# Patient Record
Sex: MALE | Race: BLACK OR AFRICAN AMERICAN | NOT HISPANIC OR LATINO | URBAN - METROPOLITAN AREA
[De-identification: names, ages, dates, MRNs, and addresses within clinical notes are randomized per-mention and may not be internally consistent; named-entity substitution may affect disease eponyms.]

---

## 2019-03-18 ENCOUNTER — HISTORICAL (OUTPATIENT)
Dept: ADMINISTRATIVE | Facility: HOSPITAL | Age: 49
End: 2019-03-18

## 2019-05-08 ENCOUNTER — HISTORICAL (OUTPATIENT)
Dept: ADMINISTRATIVE | Facility: HOSPITAL | Age: 49
End: 2019-05-08

## 2020-04-22 ENCOUNTER — HISTORICAL (OUTPATIENT)
Dept: ADMINISTRATIVE | Facility: HOSPITAL | Age: 50
End: 2020-04-22

## 2020-04-22 LAB
ALCOHOL (ETHANOL), BLOOD: <3 MG/DL (ref 0–3)
ANION GAP SERPL CALC-SCNC: 8.5 MEQ/L (ref 10–20)
APAP SERPL-MCNC: <2 UG/ML (ref 10–30)
APPEARANCE, UA: CLEAR
BACTERIA SPEC CULT: NEGATIVE /HPF
BASOPHILS NFR BLD: 0 10 (ref 0–0.1)
BASOPHILS NFR BLD: 0.3 % (ref 0–1.5)
BILIRUB UR QL STRIP: NEGATIVE MG/DL
BUDDING YEAST: NORMAL /HPF
BUN SERPL-MCNC: 14 MG/DL (ref 7–18)
CALCIUM SERPL-MCNC: 8.8 MG/DL (ref 8.5–10.1)
CASTS, URINE MICROSCOPIC: NEGATIVE /LPF
CHLORIDE SERPL-SCNC: 108 MMOL/L (ref 98–107)
CO2 SERPL-SCNC: 28 MMOL/L (ref 22–32)
COLOR UR: YELLOW
CREAT SERPL-MCNC: 1.06 MG/DL (ref 0.7–1.3)
EGFR: 95 ML/MIN/1.73M
EOSINOPHIL NFR BLD: 0.1 10 (ref 0–0.7)
EOSINOPHIL NFR BLD: 1.4 % (ref 0–7)
EPITHELIAL, URINE MICROSCOPIC: NEGATIVE /HPF
ERYTHROCYTE [DISTWIDTH] IN BLOOD BY AUTOMATED COUNT: 12.5 % (ref 11.5–14.5)
GLUCOSE (UA): 250 MG/DL
GLUCOSE SERPL-MCNC: 110 MG/DL (ref 70–99)
GRAN #: 6.19 10 (ref 2–7.5)
GRAN%: 0.5 %
GRAN%: 70.4 % (ref 50–80)
HCT VFR BLD AUTO: 44.8 % (ref 43.5–53.7)
HGB BLD-MCNC: 14.2 G/DL (ref 14.1–18.1)
HGB UR QL STRIP: NEGATIVE ERY/UL
IMMATURE GRANULOCYTES #: 0.04 10
KETONES UR QL STRIP: NEGATIVE MG/DL
LEUKOCYTE ESTERASE UR QL STRIP: NEGATIVE LEU/UL
LYMPH #: 1.8 10 (ref 1–3.5)
LYMPH%: 20.3 % (ref 12–50)
MCH RBC QN AUTO: 28.9 PG (ref 27–31)
MCHC RBC AUTO-ENTMCNC: 31.7 G% (ref 32–35)
MCV RBC AUTO: 91.1 FL (ref 80–97)
MONO #: 0.6 10 (ref 0–0.8)
MONO%: 7.1 % (ref 0–12)
NITRITE UR QL STRIP: NEGATIVE MG/DL
OSMOC: 282 MOSM/KG (ref 275–295)
PH UR STRIP: 6.5 [PH] (ref 5–7.5)
PMV BLD AUTO: 257 10 (ref 142–424)
PMV BLD AUTO: 8.4 FL (ref 7.4–10.4)
POTASSIUM SERPL-SCNC: 3.5 MMOL/L (ref 3.5–5.1)
PROT UR QL STRIP: NEGATIVE MG/DL
RBC # BLD AUTO: 4.92 M/UL (ref 4.69–6.13)
RBC #/AREA URNS HPF: NEGATIVE /HPF
SALICYLATE LEVEL: 2.9 MG/DL (ref 2.8–20)
SARS-COV-2 RNA RESP QL NAA+PROBE: NOT DETECTED
SARSCOV2 INTERNAL CONTROL: NORMAL
SODIUM BLD-SCNC: 141 MMOL/L (ref 136–145)
SP GR UR STRIP: 1.01 (ref 1–1.03)
SPERM, URINE MICROSCOPIC: NORMAL /HPF
TYPE OF SPECIMEN  (UA): NORMAL
UNCLASSIFIED CRYSTALS, UA: NORMAL /HPF
UROBILINOGEN UR STRIP-ACNC: NORMAL EU/L
WBC # BLD AUTO: 8.8 10 (ref 4–10.2)
WBC #/AREA URNS HPF: NEGATIVE /HPF

## 2020-04-23 PROBLEM — G47.9 SLEEP DISTURBANCE: Status: ACTIVE | Noted: 2020-04-23

## 2020-04-23 PROBLEM — R45.851 DEPRESSION WITH SUICIDAL IDEATION: Status: ACTIVE | Noted: 2020-04-23

## 2020-04-23 PROBLEM — Z13.9 ENCOUNTER FOR MEDICAL SCREENING EXAMINATION: Status: ACTIVE | Noted: 2020-04-23

## 2020-04-23 PROBLEM — F32.A DEPRESSION WITH SUICIDAL IDEATION: Status: ACTIVE | Noted: 2020-04-23

## 2020-04-27 PROBLEM — F32.A DEPRESSION WITH SUICIDAL IDEATION: Status: RESOLVED | Noted: 2020-04-23 | Resolved: 2020-04-27

## 2020-04-27 PROBLEM — R45.851 DEPRESSION WITH SUICIDAL IDEATION: Status: RESOLVED | Noted: 2020-04-23 | Resolved: 2020-04-27

## 2020-07-27 PROBLEM — Z13.9 ENCOUNTER FOR MEDICAL SCREENING EXAMINATION: Status: RESOLVED | Noted: 2020-04-23 | Resolved: 2020-07-27

## 2022-04-09 ENCOUNTER — HISTORICAL (OUTPATIENT)
Dept: ADMINISTRATIVE | Facility: HOSPITAL | Age: 52
End: 2022-04-09
Payer: MEDICAID

## 2022-04-25 VITALS
HEIGHT: 66 IN | SYSTOLIC BLOOD PRESSURE: 122 MMHG | WEIGHT: 147.94 LBS | DIASTOLIC BLOOD PRESSURE: 72 MMHG | BODY MASS INDEX: 23.77 KG/M2

## 2022-04-27 ENCOUNTER — HOSPITAL ENCOUNTER (INPATIENT)
Facility: HOSPITAL | Age: 52
LOS: 8 days | Discharge: HOME | DRG: 885 | End: 2022-05-05
Attending: STUDENT IN AN ORGANIZED HEALTH CARE EDUCATION/TRAINING PROGRAM | Admitting: EMERGENCY MEDICINE
Payer: MEDICAID

## 2022-04-27 DIAGNOSIS — F32.A DEPRESSION, UNSPECIFIED DEPRESSION TYPE: ICD-10-CM

## 2022-04-27 DIAGNOSIS — R45.851 SUICIDAL IDEATION: Primary | ICD-10-CM

## 2022-04-27 LAB
ALBUMIN SERPL BCP-MCNC: 3.7 G/DL (ref 3.5–5.2)
ALP SERPL-CCNC: 88 U/L (ref 55–135)
ALT SERPL W/O P-5'-P-CCNC: 21 U/L (ref 10–44)
AMPHET+METHAMPHET UR QL: NEGATIVE
ANION GAP SERPL CALC-SCNC: 6 MMOL/L (ref 8–16)
APAP SERPL-MCNC: <2 UG/ML (ref 10–20)
AST SERPL-CCNC: 12 U/L (ref 10–40)
BARBITURATES UR QL SCN>200 NG/ML: NEGATIVE
BASOPHILS # BLD AUTO: 0.05 K/UL (ref 0–0.2)
BASOPHILS NFR BLD: 0.4 % (ref 0–1.9)
BENZODIAZ UR QL SCN>200 NG/ML: NEGATIVE
BILIRUB SERPL-MCNC: 0.3 MG/DL (ref 0.1–1)
BILIRUB UR QL STRIP: NEGATIVE
BUN SERPL-MCNC: 17 MG/DL (ref 6–20)
BZE UR QL SCN: NEGATIVE
CALCIUM SERPL-MCNC: 9.3 MG/DL (ref 8.7–10.5)
CANNABINOIDS UR QL SCN: NEGATIVE
CHLORIDE SERPL-SCNC: 108 MMOL/L (ref 95–110)
CLARITY UR: CLEAR
CO2 SERPL-SCNC: 29 MMOL/L (ref 23–29)
COLOR UR: YELLOW
CREAT SERPL-MCNC: 1.1 MG/DL (ref 0.5–1.4)
CREAT UR-MCNC: 230 MG/DL (ref 23–375)
CTP QC/QA: YES
DIFFERENTIAL METHOD: ABNORMAL
EOSINOPHIL # BLD AUTO: 0.2 K/UL (ref 0–0.5)
EOSINOPHIL NFR BLD: 1.5 % (ref 0–8)
ERYTHROCYTE [DISTWIDTH] IN BLOOD BY AUTOMATED COUNT: 12.4 % (ref 11.5–14.5)
EST. GFR  (AFRICAN AMERICAN): >60 ML/MIN/1.73 M^2
EST. GFR  (NON AFRICAN AMERICAN): >60 ML/MIN/1.73 M^2
ETHANOL SERPL-MCNC: <3 MG/DL
GLUCOSE SERPL-MCNC: 110 MG/DL (ref 70–110)
GLUCOSE UR QL STRIP: NEGATIVE
HCT VFR BLD AUTO: 42.7 % (ref 40–54)
HGB BLD-MCNC: 13.6 G/DL (ref 14–18)
HGB UR QL STRIP: NEGATIVE
IMM GRANULOCYTES # BLD AUTO: 0.05 K/UL (ref 0–0.04)
IMM GRANULOCYTES NFR BLD AUTO: 0.4 % (ref 0–0.5)
KETONES UR QL STRIP: ABNORMAL
LEUKOCYTE ESTERASE UR QL STRIP: NEGATIVE
LYMPHOCYTES # BLD AUTO: 1.6 K/UL (ref 1–4.8)
LYMPHOCYTES NFR BLD: 13.2 % (ref 18–48)
MCH RBC QN AUTO: 28.5 PG (ref 27–31)
MCHC RBC AUTO-ENTMCNC: 31.9 G/DL (ref 32–36)
MCV RBC AUTO: 90 FL (ref 82–98)
METHADONE UR QL SCN>300 NG/ML: NEGATIVE
MONOCYTES # BLD AUTO: 0.8 K/UL (ref 0.3–1)
MONOCYTES NFR BLD: 6.5 % (ref 4–15)
NEUTROPHILS # BLD AUTO: 9.6 K/UL (ref 1.8–7.7)
NEUTROPHILS NFR BLD: 78 % (ref 38–73)
NITRITE UR QL STRIP: NEGATIVE
NRBC BLD-RTO: 0 /100 WBC
OPIATES UR QL SCN: NEGATIVE
PCP UR QL SCN>25 NG/ML: NEGATIVE
PH UR STRIP: 7 [PH] (ref 5–8)
PLATELET # BLD AUTO: 260 K/UL (ref 150–450)
PMV BLD AUTO: 8.3 FL (ref 9.2–12.9)
POTASSIUM SERPL-SCNC: 4.3 MMOL/L (ref 3.5–5.1)
PROT SERPL-MCNC: 7.2 G/DL (ref 6–8.4)
PROT UR QL STRIP: NEGATIVE
RBC # BLD AUTO: 4.77 M/UL (ref 4.6–6.2)
SARS-COV-2 RDRP RESP QL NAA+PROBE: NEGATIVE
SODIUM SERPL-SCNC: 143 MMOL/L (ref 136–145)
SP GR UR STRIP: 1.02 (ref 1–1.03)
TOXICOLOGY INFORMATION: NORMAL
URN SPEC COLLECT METH UR: ABNORMAL
UROBILINOGEN UR STRIP-ACNC: 1 EU/DL
WBC # BLD AUTO: 12.32 K/UL (ref 3.9–12.7)

## 2022-04-27 PROCEDURE — 36415 COLL VENOUS BLD VENIPUNCTURE: CPT | Performed by: STUDENT IN AN ORGANIZED HEALTH CARE EDUCATION/TRAINING PROGRAM

## 2022-04-27 PROCEDURE — 82077 ASSAY SPEC XCP UR&BREATH IA: CPT | Performed by: STUDENT IN AN ORGANIZED HEALTH CARE EDUCATION/TRAINING PROGRAM

## 2022-04-27 PROCEDURE — 80143 DRUG ASSAY ACETAMINOPHEN: CPT | Performed by: STUDENT IN AN ORGANIZED HEALTH CARE EDUCATION/TRAINING PROGRAM

## 2022-04-27 PROCEDURE — 11400000 HC PSYCH PRIVATE ROOM

## 2022-04-27 PROCEDURE — 81003 URINALYSIS AUTO W/O SCOPE: CPT | Mod: 59 | Performed by: STUDENT IN AN ORGANIZED HEALTH CARE EDUCATION/TRAINING PROGRAM

## 2022-04-27 PROCEDURE — 80053 COMPREHEN METABOLIC PANEL: CPT | Performed by: STUDENT IN AN ORGANIZED HEALTH CARE EDUCATION/TRAINING PROGRAM

## 2022-04-27 PROCEDURE — 85025 COMPLETE CBC W/AUTO DIFF WBC: CPT | Performed by: STUDENT IN AN ORGANIZED HEALTH CARE EDUCATION/TRAINING PROGRAM

## 2022-04-27 PROCEDURE — 99285 EMERGENCY DEPT VISIT HI MDM: CPT | Mod: 25

## 2022-04-27 PROCEDURE — U0002 COVID-19 LAB TEST NON-CDC: HCPCS | Performed by: STUDENT IN AN ORGANIZED HEALTH CARE EDUCATION/TRAINING PROGRAM

## 2022-04-27 PROCEDURE — 25000003 PHARM REV CODE 250: Performed by: STUDENT IN AN ORGANIZED HEALTH CARE EDUCATION/TRAINING PROGRAM

## 2022-04-27 PROCEDURE — 80307 DRUG TEST PRSMV CHEM ANLYZR: CPT | Performed by: STUDENT IN AN ORGANIZED HEALTH CARE EDUCATION/TRAINING PROGRAM

## 2022-04-27 PROCEDURE — 25000003 PHARM REV CODE 250: Performed by: PSYCHIATRY & NEUROLOGY

## 2022-04-27 RX ORDER — IBUPROFEN 200 MG
1 TABLET ORAL DAILY
Status: DISCONTINUED | OUTPATIENT
Start: 2022-04-28 | End: 2022-05-05 | Stop reason: HOSPADM

## 2022-04-27 RX ORDER — OLANZAPINE 10 MG/2ML
10 INJECTION, POWDER, FOR SOLUTION INTRAMUSCULAR EVERY 8 HOURS PRN
Status: DISCONTINUED | OUTPATIENT
Start: 2022-04-27 | End: 2022-05-05 | Stop reason: HOSPADM

## 2022-04-27 RX ORDER — FOLIC ACID 1 MG/1
1 TABLET ORAL DAILY
Status: DISCONTINUED | OUTPATIENT
Start: 2022-04-28 | End: 2022-05-05 | Stop reason: HOSPADM

## 2022-04-27 RX ORDER — DOCUSATE SODIUM 100 MG/1
100 CAPSULE, LIQUID FILLED ORAL DAILY PRN
Status: DISCONTINUED | OUTPATIENT
Start: 2022-04-27 | End: 2022-05-05 | Stop reason: HOSPADM

## 2022-04-27 RX ORDER — OLANZAPINE 10 MG/1
10 TABLET ORAL DAILY
Status: DISCONTINUED | OUTPATIENT
Start: 2022-04-27 | End: 2022-04-28

## 2022-04-27 RX ORDER — MAG HYDROX/ALUMINUM HYD/SIMETH 200-200-20
30 SUSPENSION, ORAL (FINAL DOSE FORM) ORAL EVERY 6 HOURS PRN
Status: DISCONTINUED | OUTPATIENT
Start: 2022-04-27 | End: 2022-05-05 | Stop reason: HOSPADM

## 2022-04-27 RX ORDER — HYDROXYZINE PAMOATE 50 MG/1
50 CAPSULE ORAL NIGHTLY PRN
Status: DISCONTINUED | OUTPATIENT
Start: 2022-04-27 | End: 2022-05-04

## 2022-04-27 RX ORDER — LOPERAMIDE HYDROCHLORIDE 2 MG/1
2 CAPSULE ORAL
Status: DISCONTINUED | OUTPATIENT
Start: 2022-04-27 | End: 2022-05-05 | Stop reason: HOSPADM

## 2022-04-27 RX ORDER — OLANZAPINE 10 MG/1
10 TABLET ORAL EVERY 8 HOURS PRN
Status: DISCONTINUED | OUTPATIENT
Start: 2022-04-27 | End: 2022-05-05 | Stop reason: HOSPADM

## 2022-04-27 RX ORDER — ACETAMINOPHEN 325 MG/1
650 TABLET ORAL EVERY 6 HOURS PRN
Status: DISCONTINUED | OUTPATIENT
Start: 2022-04-27 | End: 2022-05-05 | Stop reason: HOSPADM

## 2022-04-27 RX ADMIN — OLANZAPINE 10 MG: 5 TABLET, FILM COATED ORAL at 12:04

## 2022-04-27 RX ADMIN — HYDROXYZINE PAMOATE 50 MG: 50 CAPSULE ORAL at 08:04
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
## 2022-04-27 NOTE — NURSING
Pt admitted from [facility] er per  with security and er tech at side. Belongings also at side and given to mht to inventory.    Pt with contraband check prior to coming on unit per security with negative findings.  Pt with hx of drug use, depression, anxiety, ptsd.  Pt released from custodial this morning.  Pt is homeless.  Pt states he was having intermittent thoughts of si this morning but denies at present. Mood depressed.  Eye contact poor. Pt tearful at times during assessment.   Pt states he is severely stressed out and decided to get help before he goes down the wrong path again.  Pt denies hi or a v hallucinations.  Gravely disabled.  Pt also c/o problems sleeping at night.  Pt oriented to unit and unit routine and plan of care.  Verbalized understanding.  Pt escorted to his room as he requests to lie down.  Voices no needs at present.  Safety checks q15 min per md orders.  Will cont to monitor pt for safety.
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
## 2022-04-28 LAB
CHOLEST SERPL-MCNC: 97 MG/DL (ref 120–199)
CHOLEST/HDLC SERPL: 1.4 {RATIO} (ref 2–5)
ESTIMATED AVG GLUCOSE: 100 MG/DL (ref 68–131)
HBA1C MFR BLD: 5.1 % (ref 4–5.6)
HDLC SERPL-MCNC: 69 MG/DL (ref 40–75)
HDLC SERPL: 71.1 % (ref 20–50)
LDLC SERPL CALC-MCNC: 17.6 MG/DL (ref 63–159)
NONHDLC SERPL-MCNC: 28 MG/DL
TRIGL SERPL-MCNC: 52 MG/DL (ref 30–150)

## 2022-04-28 PROCEDURE — 90833 PR PSYCHOTHERAPY W/PATIENT W/E&M, 30 MIN (ADD ON): ICD-10-PCS | Mod: AF,HB,, | Performed by: PSYCHIATRY & NEUROLOGY

## 2022-04-28 PROCEDURE — 99223 PR INITIAL HOSPITAL CARE,LEVL III: ICD-10-PCS | Mod: AF,HB,, | Performed by: PSYCHIATRY & NEUROLOGY

## 2022-04-28 PROCEDURE — 36415 COLL VENOUS BLD VENIPUNCTURE: CPT | Performed by: PSYCHIATRY & NEUROLOGY

## 2022-04-28 PROCEDURE — 83036 HEMOGLOBIN GLYCOSYLATED A1C: CPT | Performed by: PSYCHIATRY & NEUROLOGY

## 2022-04-28 PROCEDURE — 90833 PSYTX W PT W E/M 30 MIN: CPT | Mod: AF,HB,, | Performed by: PSYCHIATRY & NEUROLOGY

## 2022-04-28 PROCEDURE — 80061 LIPID PANEL: CPT | Performed by: PSYCHIATRY & NEUROLOGY

## 2022-04-28 PROCEDURE — 99223 1ST HOSP IP/OBS HIGH 75: CPT | Mod: AF,HB,, | Performed by: PSYCHIATRY & NEUROLOGY

## 2022-04-28 PROCEDURE — 25000003 PHARM REV CODE 250: Performed by: STUDENT IN AN ORGANIZED HEALTH CARE EDUCATION/TRAINING PROGRAM

## 2022-04-28 PROCEDURE — 11400000 HC PSYCH PRIVATE ROOM

## 2022-04-28 PROCEDURE — 25000003 PHARM REV CODE 250: Performed by: PSYCHIATRY & NEUROLOGY

## 2022-04-28 PROCEDURE — 25000003 PHARM REV CODE 250: Performed by: INTERNAL MEDICINE

## 2022-04-28 RX ORDER — SODIUM CHLORIDE 0.9 % (FLUSH) 0.9 %
10 SYRINGE (ML) INJECTION
Status: DISCONTINUED | OUTPATIENT
Start: 2022-04-28 | End: 2022-05-01

## 2022-04-28 RX ORDER — VENLAFAXINE HYDROCHLORIDE 37.5 MG/1
37.5 CAPSULE, EXTENDED RELEASE ORAL DAILY
Status: DISCONTINUED | OUTPATIENT
Start: 2022-04-28 | End: 2022-04-30

## 2022-04-28 RX ORDER — TALC
6 POWDER (GRAM) TOPICAL NIGHTLY PRN
Status: DISCONTINUED | OUTPATIENT
Start: 2022-04-28 | End: 2022-05-05 | Stop reason: HOSPADM

## 2022-04-28 RX ORDER — MIRTAZAPINE 15 MG/1
15 TABLET, FILM COATED ORAL NIGHTLY
Status: DISCONTINUED | OUTPATIENT
Start: 2022-04-28 | End: 2022-05-02

## 2022-04-28 RX ADMIN — OLANZAPINE 10 MG: 10 TABLET, FILM COATED ORAL at 11:04

## 2022-04-28 RX ADMIN — HYDROXYZINE PAMOATE 50 MG: 50 CAPSULE ORAL at 08:04

## 2022-04-28 RX ADMIN — MIRTAZAPINE 15 MG: 15 TABLET, FILM COATED ORAL at 08:04

## 2022-04-28 RX ADMIN — THERA TABS 1 TABLET: TAB at 08:04

## 2022-04-28 RX ADMIN — OLANZAPINE 10 MG: 5 TABLET, FILM COATED ORAL at 08:04

## 2022-04-28 RX ADMIN — ACETAMINOPHEN 650 MG: 325 TABLET ORAL at 08:04

## 2022-04-28 RX ADMIN — FOLIC ACID 1 MG: 1 TABLET ORAL at 08:04

## 2022-04-28 RX ADMIN — VENLAFAXINE HYDROCHLORIDE 37.5 MG: 37.5 CAPSULE, EXTENDED RELEASE ORAL at 10:04

## 2022-04-28 RX ADMIN — Medication 6 MG: at 08:04

## 2022-04-28 NOTE — PLAN OF CARE
Patient remained calm and cooperative while on shift.  Compliant with scheduled medication.  Anxiety noted.  Provided with 15 min obs to maintain safety and health.  No concerns.
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
## 2022-04-28 NOTE — SUBJECTIVE & OBJECTIVE
"Past Medical History:   Diagnosis Date    Insomnia     Insomnia        History reviewed. No pertinent surgical history.    Review of patient's allergies indicates:  No Known Allergies    No current facility-administered medications on file prior to encounter.     Current Outpatient Medications on File Prior to Encounter   Medication Sig    clonazePAM (KLONOPIN) 0.5 MG tablet Take 1 tablet (0.5 mg total) by mouth 3 (three) times daily as needed for Anxiety.    mirtazapine (REMERON) 15 MG tablet Take 1 tablet (15 mg total) by mouth every evening.    [DISCONTINUED] trazodone (DESYREL) 50 MG tablet Take 1 tablet (50 mg total) by mouth every evening.     Family History    None       Tobacco Use    Smoking status: Current Every Day Smoker     Packs/day: 1.00     Years: 25.00     Pack years: 25.00     Types: Cigarettes    Smokeless tobacco: Never Used   Substance and Sexual Activity    Alcohol use: Yes     Comment: "Crown Apple" last drink yesterday; drinks 3 days a week    Drug use: Yes     Types: Marijuana    Sexual activity: Yes     Partners: Female     Birth control/protection: None     Review of Systems   Constitutional:  Negative for fatigue and fever.   HENT:  Negative for congestion, ear pain and sore throat.    Eyes:  Negative for pain and discharge.   Respiratory:  Negative for cough, shortness of breath and wheezing.    Gastrointestinal:  Negative for abdominal pain, constipation, diarrhea, nausea and vomiting.   Endocrine: Negative for cold intolerance and heat intolerance.   Genitourinary:  Negative for difficulty urinating, dysuria and frequency.   Musculoskeletal:  Negative for arthralgias.   Allergic/Immunologic: Negative for environmental allergies.   Neurological:  Negative for dizziness, tremors and seizures.   Psychiatric/Behavioral:  Positive for behavioral problems, dysphoric mood, sleep disturbance and suicidal ideas.    All other systems reviewed and are negative.  Objective:     Vital Signs (Most " Recent):  Temp: 98.1 °F (36.7 °C) (04/27/22 1935)  Pulse: 106 (04/27/22 1935)  Resp: 16 (04/27/22 1935)  BP: 105/66 (04/27/22 1935)  SpO2: (!) 94 % (04/27/22 1935)   Vital Signs (24h Range):  Temp:  [98.1 °F (36.7 °C)-98.2 °F (36.8 °C)] 98.1 °F (36.7 °C)  Pulse:  [] 106  Resp:  [16-20] 16  SpO2:  [94 %-100 %] 94 %  BP: (105-145)/(64-93) 105/66     Weight: 68.9 kg (152 lb)  Body mass index is 23.81 kg/m².    Physical Exam  Vitals and nursing note reviewed.   Constitutional:       Appearance: Normal appearance.   HENT:      Head: Normocephalic and atraumatic.      Nose: Nose normal.      Mouth/Throat:      Mouth: Mucous membranes are moist.      Pharynx: Oropharynx is clear.   Eyes:      Extraocular Movements: Extraocular movements intact.      Conjunctiva/sclera: Conjunctivae normal.      Pupils: Pupils are equal, round, and reactive to light.   Cardiovascular:      Rate and Rhythm: Normal rate and regular rhythm.      Pulses: Normal pulses.      Heart sounds: Normal heart sounds.   Pulmonary:      Effort: Pulmonary effort is normal.      Breath sounds: Normal breath sounds.   Abdominal:      General: Abdomen is flat. Bowel sounds are normal.      Palpations: Abdomen is soft.   Musculoskeletal:         General: Normal range of motion.      Cervical back: Normal range of motion and neck supple.   Skin:     General: Skin is warm and dry.      Capillary Refill: Capillary refill takes less than 2 seconds.      Comments: No rashes on limited skin exam.   Neurological:      General: No focal deficit present.      Mental Status: He is alert and oriented to person, place, and time.      Cranial Nerves: No cranial nerve deficit.      Comments: I Olfactory:  Sense of smell intact    II Optic:  Pupils equal round react to light.  Vision intact.    III, IV, VI, Ocular motor, Trochlear, Abducens:  Extraocular movements intact    V Trigeminal:  Facial sensation intact facial sensation intact,, muscles of mastication intact  muscles of mastication intact, corneal reflex intact, corneal reflex intact    VII Facial:  Muscles of facial expression intact     VIII Vestibular cochlear: Hearing intact vestibular cochlear: Hearing intact    IX Glossopharyngeal:  Gag reflex intact.  Tasting intact.     X Vagus:  Gag reflex intact.    XI Spinal Accessory:  Shoulder shrug intact.  Head rotation intact.    XII Hypoglossal:  Tongue movements intact.     Psychiatric:      Comments: Patient appears depressed         CRANIAL NERVES     CN III, IV, VI   Pupils are equal, round, and reactive to light.     Significant Labs: All pertinent labs within the past 24 hours have been reviewed.    Significant Imaging: I have reviewed all pertinent imaging results/findings within the past 24 hours.
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
## 2022-04-28 NOTE — PSYCH
"Patient attended group psychotherapy today; when asked for what makes life worth living for him he stated, "My kids." Patient was congratulated for the statement. Patient had some difficulty following the topic yet he stayed for the full group session; patient was initially the first person who volunteered to speak and formulated the topic for the group to follow which was agreed upon by all parties.         Intervention       CBT-based group psychotherapy today focused on what does it mean to "own your own pain" and how can a person integrate their own pain into their way of being in the world in order to promote a greater sense of internal coping strategies; re-framing sources of distress and assessing progress being made in implementing a behavioral health safety plan were reviewed.          Response    Patient showed leadership skills while in the session today. Yet, his anxiety and depression is interfering in his desire to more fully explore the topic of owning his own pain and what that may mean to him to explore the full implications of this topic. Some social anxiety traits were evident as the patient spoke. Patient did contributed to the group discussion.         Plan    Patient will be encouraged to continue to participate in group psychotherapy sessions with focused attention on re-framing anxieties.   "

## 2022-04-28 NOTE — PLAN OF CARE
"Patient up early this am sitting in the dining room drinking coffee. Patient is pleasant, and cooperative. Patient endorses depression and anxiety, stated  "I slept so hard last night better than I have in 2 months with c/o sore throat". Dr. Olivo visited this am medical H&P completed. PRN Tylenol administered for sore throat and noted effective. Appetite good for meals and medication compliant without side effects noted. Denies hallucinations and ideations. Patient sat in the day room today reading personal bible and novel that he had started previously. Engaged with peers ad colton. Patient is sitting in the dining room at this time supper meal being served and telemed Social Service in progress with Taurus Tapia. Dr. Whitehead visited per telemed  this am with new orders Remeron 15 mg po HS, Effexor  37.5 mg po daily. Close observations continued and safe environment maintained.i  "

## 2022-04-29 PROCEDURE — 25000003 PHARM REV CODE 250: Performed by: PSYCHIATRY & NEUROLOGY

## 2022-04-29 PROCEDURE — 90833 PSYTX W PT W E/M 30 MIN: CPT | Mod: SA,HB,, | Performed by: PSYCHIATRY & NEUROLOGY

## 2022-04-29 PROCEDURE — 99232 SBSQ HOSP IP/OBS MODERATE 35: CPT | Mod: SA,HB,, | Performed by: PSYCHIATRY & NEUROLOGY

## 2022-04-29 PROCEDURE — 11400000 HC PSYCH PRIVATE ROOM

## 2022-04-29 PROCEDURE — 90833 PR PSYCHOTHERAPY W/PATIENT W/E&M, 30 MIN (ADD ON): ICD-10-PCS | Mod: SA,HB,, | Performed by: PSYCHIATRY & NEUROLOGY

## 2022-04-29 PROCEDURE — 99232 PR SUBSEQUENT HOSPITAL CARE,LEVL II: ICD-10-PCS | Mod: SA,HB,, | Performed by: PSYCHIATRY & NEUROLOGY

## 2022-04-29 RX ORDER — GABAPENTIN 300 MG/1
300 CAPSULE ORAL 3 TIMES DAILY
Status: DISCONTINUED | OUTPATIENT
Start: 2022-04-29 | End: 2022-05-02

## 2022-04-29 RX ADMIN — THERA TABS 1 TABLET: TAB at 08:04

## 2022-04-29 RX ADMIN — VENLAFAXINE HYDROCHLORIDE 37.5 MG: 37.5 CAPSULE, EXTENDED RELEASE ORAL at 08:04

## 2022-04-29 RX ADMIN — GABAPENTIN 300 MG: 300 CAPSULE ORAL at 11:04

## 2022-04-29 RX ADMIN — HYDROXYZINE PAMOATE 50 MG: 50 CAPSULE ORAL at 08:04

## 2022-04-29 RX ADMIN — GABAPENTIN 300 MG: 300 CAPSULE ORAL at 04:04

## 2022-04-29 RX ADMIN — GABAPENTIN 300 MG: 300 CAPSULE ORAL at 08:04

## 2022-04-29 RX ADMIN — FOLIC ACID 1 MG: 1 TABLET ORAL at 08:04

## 2022-04-29 RX ADMIN — Medication 6 MG: at 08:04

## 2022-04-29 RX ADMIN — MIRTAZAPINE 15 MG: 15 TABLET, FILM COATED ORAL at 08:04
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
## 2022-04-29 NOTE — PLAN OF CARE
"  Problem: Adult Inpatient Plan of Care  Goal: Optimal Comfort and Wellbeing  Outcome: Ongoing, Progressing   Telemental Health Protocols Employed        Group Note      Behavior    Patient attended group psychotherapy today. Patient continued to exhibit depressed mood with appropriate affect. Yet, it is to be noted that near the end of the session a verbal  assessment was completed with this patient and his depressive mood seemed to have lightened slightly after the session.         Intervention     CBT-based group psychotherapy focused this evening on how to avoid the trap of self-rejection by knowing your limitations; an exploration of the dangers of the compulsion of the "need to be needed" were discussed within the context of goal setting for time for learning how to balance time for solitude and time for others.           Response    Patient remarked that the session helped him create space in his inner most self for inner peace. Patient was asked specifically if the imagination portion of the exercise assisted in any way and he stated it helped him slow down and pay attention. Patient agreed that he was no longer feeling on pins and needles. During the course of the dialogue, patient agreed that he is better able to be hospitable to people when he is able to not have racing thoughts and sleep difficulties; agreed that the concentration he exhibited during group psychotherapy today helped him realize that he can find peace of heart and mind with the use of the imagination.           Plan      Patient will be encouraged to continue to attend group psychotherapy with continued work in the area of internal coping strategies as well as external coping strategies.        "
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
## 2022-04-29 NOTE — PLAN OF CARE
Patient remains cooperative.  Increased anxiety noted on shift and patient given Zyprexa to assist with anxiety after first med pass.  Patient slept through the night and did not wake up.  Denies ideations on night shift.  No further concerns.
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
## 2022-04-30 PROCEDURE — S4991 NICOTINE PATCH NONLEGEND: HCPCS | Performed by: PSYCHIATRY & NEUROLOGY

## 2022-04-30 PROCEDURE — 25000003 PHARM REV CODE 250: Performed by: PSYCHIATRY & NEUROLOGY

## 2022-04-30 PROCEDURE — 11400000 HC PSYCH PRIVATE ROOM

## 2022-04-30 PROCEDURE — 99232 SBSQ HOSP IP/OBS MODERATE 35: CPT | Mod: AF,HB,, | Performed by: PSYCHIATRY & NEUROLOGY

## 2022-04-30 PROCEDURE — 99232 PR SUBSEQUENT HOSPITAL CARE,LEVL II: ICD-10-PCS | Mod: AF,HB,, | Performed by: PSYCHIATRY & NEUROLOGY

## 2022-04-30 RX ORDER — VENLAFAXINE HYDROCHLORIDE 75 MG/1
75 CAPSULE, EXTENDED RELEASE ORAL DAILY
Status: DISCONTINUED | OUTPATIENT
Start: 2022-05-01 | End: 2022-05-05 | Stop reason: HOSPADM

## 2022-04-30 RX ADMIN — Medication 6 MG: at 08:04

## 2022-04-30 RX ADMIN — OLANZAPINE 10 MG: 10 TABLET, FILM COATED ORAL at 05:04

## 2022-04-30 RX ADMIN — FOLIC ACID 1 MG: 1 TABLET ORAL at 09:04

## 2022-04-30 RX ADMIN — NICOTINE 1 PATCH: 14 PATCH, EXTENDED RELEASE TRANSDERMAL at 09:04

## 2022-04-30 RX ADMIN — GABAPENTIN 300 MG: 300 CAPSULE ORAL at 03:04

## 2022-04-30 RX ADMIN — GABAPENTIN 300 MG: 300 CAPSULE ORAL at 08:04

## 2022-04-30 RX ADMIN — HYDROXYZINE PAMOATE 50 MG: 50 CAPSULE ORAL at 08:04

## 2022-04-30 RX ADMIN — VENLAFAXINE HYDROCHLORIDE 37.5 MG: 37.5 CAPSULE, EXTENDED RELEASE ORAL at 09:04

## 2022-04-30 RX ADMIN — MIRTAZAPINE 15 MG: 15 TABLET, FILM COATED ORAL at 08:04

## 2022-04-30 RX ADMIN — GABAPENTIN 300 MG: 300 CAPSULE ORAL at 09:04

## 2022-04-30 RX ADMIN — THERA TABS 1 TABLET: TAB at 09:04

## 2022-04-30 NOTE — PLAN OF CARE
POC reviewed this shift and is on going. Patient is withdrawn, depressed, anxious, irritable, and showing pressured speech. Endorses Suicidal Ideation. Denies Homicidal Ideation, Auditory Hallucinations, Visual Hallucinations, Tactile Hallucinations, Gustatory Hallucinations, and Delusions. Interacts well with peers. Easily agitated. Pt continues to be medication compliant and staff will continue to monitor pt closely while on the unit.

## 2022-04-30 NOTE — NURSING
Patient conts to become  agitated ,speaking to family and friends,making promises,attempting to please family with his promises,pt.not looking  at the big picture,was medicated with zyprexa 10mgs po for agitation,@1729,@1837,pt.quiet,acknowledges medication was effective.conts to be monitored as per protocol.

## 2022-04-30 NOTE — PROGRESS NOTES
"PSYCHIATRY DAILY INPATIENT PROGRESS NOTE  SUBSEQUENT HOSPITAL VISIT    ENCOUNTER DATE: 4/30/2022  SITE: [facility]    DATE OF ADMISSION: 4/27/2022 11:16 AM  LENGTH OF STAY: 3 days    CHIEF COMPLAINT   Leyla Rayo is a 51 y.o. male, seen during daily abreu rounds on the inpatient unit.  Leyla Rayo presented with the chief complaint of "high anxiety and taking drugs"    The patient was seen and examined. The chart was reviewed.     Reviewed notes from Rns and MD and labs from the last 24 hours.    The patient's case was discussed with the treatment team/care providers today including Rns and MD    Staff reports no behavioral or management issues.     The patient has been compliant with treatment.    Subjective 04/30/2022    Patient presents today with blunted affect, reports ongoing depression and anxiety, racing thoughts. Patient cites numerous stressors, including conflict with family members.    Pt reports doing alright today. States that he is tolerating medication without side effects. Continues to report depressed mood, but this is somewhat improving. Has noticed improved concentration.     Amenable to titration of Effexor    The patient reports side effects of  "feeling jumpy" after receiving PRN zyprexa last night. Symptoms resolved upon waking this morning.  .    Psychiatric ROS (observed, reported, or endorsed/denied):  Depressed mood - Continuing  Interest/pleasure/anhedonia: Continuing  Guilt/hopelessness/worthlessness - Continuing  Changes in Sleep - Continuing  Changes in Appetite - denies  Changes in Concentration - Continuing  Changes in Energy - Continuing  PMA/R- None  Suicidal- active/passive ideations - variable  Homicidal ideations: active/passive ideations - None    Hallucinations - denies  Delusions - denies  Disorganized behavior - denies  Disorganized speech - denies  Negative symptoms - denies    Elevated mood - denies  Decreased need for sleep - denies  Grandiosity - denies  Racing " thoughts - denies  Impulsivity - denies  Irritability- denies  Increased energy - denies  Distractibility - denies  Increase in goal-directed activity or PMA- denies    Symptoms of FREEMAN - Yes  Symptoms of Panic Disorder- None  Symptoms of PTSD - Yes    Overall progress: Patient is showing no improvement on the Unit to date    Psychotherapy:  · Target symptoms: depression, anxiety   · Why chosen therapy is appropriate versus another modality: relevant to diagnosis, evidence based practice  · Outcome monitoring methods: self-report, observation  · Therapeutic intervention type: insight oriented psychotherapy, supportive psychotherapy  · Topics discussed/themes: relationships difficulties, building skills sets for symptom management, symptom recognition  · The patient's response to the intervention is accepting. The patient's progress toward treatment goals is fair.   · Duration of intervention: 16 minutes.    Medical ROS  Review of Systems   Constitutional: Negative.    HENT: Negative.    Eyes: Negative.    Respiratory: Negative.    Cardiovascular: Negative.    Gastrointestinal: Negative.    Genitourinary: Negative.    Musculoskeletal: Negative.    Skin: Negative.    Neurological:        Reports neuropathic pain to bilateral hands   Endo/Heme/Allergies: Negative.    Psychiatric/Behavioral:        As noted       PAST MEDICAL HISTORY   Past Medical History:   Diagnosis Date    History of psychiatric hospitalization     Insomnia     Insomnia     Psychiatric problem        PSYCHOTROPIC MEDICATIONS   Scheduled Meds:   folic acid  1 mg Oral Daily    gabapentin  300 mg Oral TID    mirtazapine  15 mg Oral QHS    multivitamin  1 tablet Oral Daily    nicotine  1 patch Transdermal Daily    venlafaxine  37.5 mg Oral Daily     Continuous Infusions:  PRN Meds:.acetaminophen, aluminum-magnesium hydroxide-simethicone, docusate sodium, hydrOXYzine pamoate, loperamide, melatonin, OLANZapine **AND** OLANZapine, sodium chloride  "0.9%    EXAMINATION    VITALS   Vitals:    04/28/22 1939 04/29/22 0827 04/29/22 1908 04/30/22 0741   BP: 116/60 119/60 (!) 114/58 135/86   BP Location: Left arm Left arm Left arm Left arm   Patient Position: Sitting Sitting Sitting Sitting   Pulse: 92 87 96 95   Resp: 20 20 20 20   Temp: 98.3 °F (36.8 °C) 97.8 °F (36.6 °C) 98.9 °F (37.2 °C) 98 °F (36.7 °C)   TempSrc: Oral Oral Oral Oral   SpO2: 97% 97% 96% 99%   Weight:       Height:           Body mass index is 23.81 kg/m².    CONSTITUTIONAL  General Appearance: unremarkable, age appropriate, normal weight    MUSCULOSKELETAL  Muscle Strength and Tone:no tremor, no tic  Abnormal Involuntary Movements: No  Gait and Station: non-ataxic    PSYCHIATRIC   Level of Consciousness: awake, alert  and oriented  Orientation: person, place, time, situation, day of week, month of year and year  Grooming: Casually dressed and Well groomed  Psychomotor Behavior: normal, cooperative, friendly and cooperative  Speech: monotone  Language: grossly intact  Mood: "depressed, improving."  Affect: Blunted  Thought Process: linear, logical  Associations: intact   Thought Content: DENIES homicidal ideation, recent suicidal ideation as noted above and no delusions  Perceptions: denies hallucinations  Memory: Able to recall past events, Remote intact and Recent intact  Attention:Attends to interview without distraction  Fund of Knowledge: Aware of current events and Vocabulary appropriate   Estimate if Intelligence:  Average based on work/education history, vocabulary and mental status exam  Insight: has awareness of illness  Judgment: behavior is adequate to circumstances    DIAGNOSTIC TESTING   Laboratory Results  No results found for this or any previous visit (from the past 24 hour(s)).    MEDICAL DECISION MAKING       ASSESSMENT         MDD, recurrent, severe without psychotic features  Unspecified Anxiety Disorder  PTSD     Nicotine Dependence  Polysubstance dependence in early full " remission     Psychosocial stressors     Chronic pain        PROBLEM LIST AND MANAGEMENT PLANS     Depression: pt counseled  -start trial of Effexor XR at 37.5 mg po q day --> Titrate to 75mg PO daily  -start re-trial of Remeron at 15 mg po q HS     Anxiety: pt counseled  -meds as above  -vistaril prn  -Gabapentin off label as below     PTSD: pt counseled  -meds as above  -consider trial of prazosin     Nicotine Dependence: pt counseled  -start nicotine 14 mg patch dermal q day     Polysubstance dependence in early full remission: pt counseled  -encouraged abstinence      Psychosocial stressors: pt counseled  -SW consulted to assist with resources      Chronic pain: pt counseled  - Gabapentin 300 mg TID, titrate as indicated    PRESCRIPTION DRUG MANAGEMENT  Compliance: yes  Side Effects: no  Regimen Adjustments: see above     Discussed diagnosis, risks and benefits of proposed treatment vs alternative treatments vs no treatment, potential side effects of these treatments and the inherent unpredictability of treatment. The patient expresses understanding of the above and displays the capacity to agree with this treatment given said understanding. Patient also agrees that, currently, the benefits outweigh the risks and would like to pursue/continue treatment at this time.        DIAGNOSTIC TESTING  Labs reviewed with patient; follow up pending labs     Disposition:  -Will attempt to obtain outside psychiatric records if available  -SW to assist with aftercare planning and collateral  -Once stable discharge home with outpatient follow up care and/or rehab  -Continue inpatient treatment under a PEC and/or CEC for danger to self/ danger to others/grave disability as evident by danger to self and gravely disabled    STAFF:   Rick Erickson MD  Psychiatry

## 2022-05-01 PROCEDURE — 25000003 PHARM REV CODE 250: Performed by: PSYCHIATRY & NEUROLOGY

## 2022-05-01 PROCEDURE — 99232 PR SUBSEQUENT HOSPITAL CARE,LEVL II: ICD-10-PCS | Mod: AF,HB,, | Performed by: PSYCHIATRY & NEUROLOGY

## 2022-05-01 PROCEDURE — 11400000 HC PSYCH PRIVATE ROOM

## 2022-05-01 PROCEDURE — 99232 SBSQ HOSP IP/OBS MODERATE 35: CPT | Mod: AF,HB,, | Performed by: PSYCHIATRY & NEUROLOGY

## 2022-05-01 PROCEDURE — S4991 NICOTINE PATCH NONLEGEND: HCPCS | Performed by: PSYCHIATRY & NEUROLOGY

## 2022-05-01 RX ADMIN — MIRTAZAPINE 15 MG: 15 TABLET, FILM COATED ORAL at 08:05

## 2022-05-01 RX ADMIN — VENLAFAXINE HYDROCHLORIDE 75 MG: 75 CAPSULE, EXTENDED RELEASE ORAL at 08:05

## 2022-05-01 RX ADMIN — GABAPENTIN 300 MG: 300 CAPSULE ORAL at 08:05

## 2022-05-01 RX ADMIN — THERA TABS 1 TABLET: TAB at 08:05

## 2022-05-01 RX ADMIN — NICOTINE 1 PATCH: 14 PATCH, EXTENDED RELEASE TRANSDERMAL at 09:05

## 2022-05-01 RX ADMIN — Medication 6 MG: at 08:05

## 2022-05-01 RX ADMIN — HYDROXYZINE PAMOATE 50 MG: 50 CAPSULE ORAL at 08:05

## 2022-05-01 RX ADMIN — FOLIC ACID 1 MG: 1 TABLET ORAL at 08:05

## 2022-05-01 RX ADMIN — GABAPENTIN 300 MG: 300 CAPSULE ORAL at 02:05

## 2022-05-01 NOTE — PLAN OF CARE
POC reviewed this shift and is on going. Patient is alert,but preoccupied,hyperverbal,thoughts disorganized,mood labile, Endorses the need to have closure with his exwife, Denies h/s ideations, Pt continues to be medication compliant and staff will continue to monitor pt closely while on the unit. ,med education with gabapentin,pt.states he have a decrease of racing thoughts,care as per treatment plan.

## 2022-05-01 NOTE — PROGRESS NOTES
"PSYCHIATRY DAILY INPATIENT PROGRESS NOTE  SUBSEQUENT HOSPITAL VISIT    ENCOUNTER DATE: 5/1/2022  SITE: [facility]    DATE OF ADMISSION: 4/27/2022 11:16 AM  LENGTH OF STAY: 4 days    CHIEF COMPLAINT   Leyla Rayo is a 51 y.o. male, seen during daily abreu rounds on the inpatient unit.  Leyla Rayo presented with the chief complaint of "high anxiety and taking drugs"    The patient was seen and examined. The chart was reviewed.     Reviewed notes from Rns and MD and labs from the last 24 hours.    The patient's case was discussed with the treatment team/care providers today including Rns and MD    Staff reports no behavioral or management issues.     The patient has been compliant with treatment.    Subjective 05/01/2022    Patient presents today with blunted affect, reports ongoing depression and anxiety, racing thoughts. Patient cites numerous stressors, including conflict with family members.    Pt reports doing alright today. States he tolerated titration of effexor without any issues.States his mood today is "pretty good." States that his depression continues to improve. Denies anxiety today. States that being on a schedule has been helpful for him. States that he left his nicotine patch on last night and he thinks this made him feel jittery. States he had some trouble falling asleep. Would like to try taking the patch off prior to bedtime tonight rather than adjusting meds.     Pt got upset with his sister yesterday due to a disagreement, but feels he was better able to control his response to this, which he feels good about.     Denies DES and AVH.     The patient denies any side effects to medications.    Psychiatric ROS (observed, reported, or endorsed/denied):  Depressed mood - Continuing  Interest/pleasure/anhedonia: Continuing  Guilt/hopelessness/worthlessness - Continuing  Changes in Sleep - Continuing  Changes in Appetite - denies  Changes in Concentration - Continuing  Changes in Energy - " Continuing  PMA/R- None  Suicidal- active/passive ideations - variable  Homicidal ideations: active/passive ideations - None    Hallucinations - denies  Delusions - denies  Disorganized behavior - denies  Disorganized speech - denies  Negative symptoms - denies    Elevated mood - denies  Decreased need for sleep - denies  Grandiosity - denies  Racing thoughts - denies  Impulsivity - denies  Irritability- denies  Increased energy - denies  Distractibility - denies  Increase in goal-directed activity or PMA- denies    Symptoms of FREEMAN - Yes  Symptoms of Panic Disorder- None  Symptoms of PTSD - Yes    Overall progress: Patient is showing no improvement on the Unit to date    Psychotherapy:  · Target symptoms: depression, anxiety   · Why chosen therapy is appropriate versus another modality: relevant to diagnosis, evidence based practice  · Outcome monitoring methods: self-report, observation  · Therapeutic intervention type: insight oriented psychotherapy, supportive psychotherapy  · Topics discussed/themes: relationships difficulties, building skills sets for symptom management, symptom recognition  · The patient's response to the intervention is accepting. The patient's progress toward treatment goals is fair.   · Duration of intervention: 16 minutes.    Medical ROS  Review of Systems   Constitutional: Negative.    HENT: Negative.    Eyes: Negative.    Respiratory: Negative.    Cardiovascular: Negative.    Gastrointestinal: Negative.    Genitourinary: Negative.    Musculoskeletal: Negative.    Skin: Negative.    Neurological:        Reports neuropathic pain to bilateral hands   Endo/Heme/Allergies: Negative.    Psychiatric/Behavioral:        As noted       PAST MEDICAL HISTORY   Past Medical History:   Diagnosis Date    History of psychiatric hospitalization     Insomnia     Insomnia     Psychiatric problem        PSYCHOTROPIC MEDICATIONS   Scheduled Meds:   folic acid  1 mg Oral Daily    gabapentin  300 mg Oral  "TID    mirtazapine  15 mg Oral QHS    multivitamin  1 tablet Oral Daily    nicotine  1 patch Transdermal Daily    venlafaxine  75 mg Oral Daily     Continuous Infusions:  PRN Meds:.acetaminophen, aluminum-magnesium hydroxide-simethicone, docusate sodium, hydrOXYzine pamoate, loperamide, melatonin, OLANZapine **AND** OLANZapine, sodium chloride 0.9%    EXAMINATION    VITALS   Vitals:    04/29/22 1908 04/30/22 0741 04/30/22 1913 05/01/22 0736   BP: (!) 114/58 135/86 125/80 127/77   BP Location: Left arm Left arm Left arm Right arm   Patient Position: Sitting Sitting Sitting    Pulse: 96 95 (!) 119 (!) 112   Resp: 20 20 20 20   Temp: 98.9 °F (37.2 °C) 98 °F (36.7 °C) 99.5 °F (37.5 °C) 99 °F (37.2 °C)   TempSrc: Oral Oral Oral Oral   SpO2: 96% 99% (!) 94% 98%   Weight:       Height:           Body mass index is 23.81 kg/m².    CONSTITUTIONAL  General Appearance: unremarkable, age appropriate, normal weight    MUSCULOSKELETAL  Muscle Strength and Tone:no tremor, no tic  Abnormal Involuntary Movements: No  Gait and Station: non-ataxic    PSYCHIATRIC   Level of Consciousness: awake, alert  and oriented  Orientation: person, place, time, situation, day of week, month of year and year  Grooming: Casually dressed and Well groomed  Psychomotor Behavior: normal, cooperative, friendly and cooperative  Speech: monotone  Language: grossly intact  Mood: "depressed, improving."  Affect: Blunted  Thought Process: linear, logical  Associations: intact   Thought Content: DENIES homicidal ideation, recent suicidal ideation as noted above and no delusions  Perceptions: denies hallucinations  Memory: Able to recall past events, Remote intact and Recent intact  Attention:Attends to interview without distraction  Fund of Knowledge: Aware of current events and Vocabulary appropriate   Estimate if Intelligence:  Average based on work/education history, vocabulary and mental status exam  Insight: has awareness of illness  Judgment: " behavior is adequate to circumstances    DIAGNOSTIC TESTING   Laboratory Results  No results found for this or any previous visit (from the past 24 hour(s)).    MEDICAL DECISION MAKING       ASSESSMENT         MDD, recurrent, severe without psychotic features  Unspecified Anxiety Disorder  PTSD     Nicotine Dependence  Polysubstance dependence in early full remission     Psychosocial stressors     Chronic pain        PROBLEM LIST AND MANAGEMENT PLANS     Depression: pt counseled  -start trial of Effexor XR at 37.5 mg po q day --> Titrate to 75mg PO daily on 4/30  -start re-trial of Remeron at 15 mg po q HS     Anxiety: pt counseled  -meds as above  -vistaril prn  -Gabapentin off label as below     PTSD: pt counseled  -meds as above  -consider trial of prazosin     Nicotine Dependence: pt counseled  -start nicotine 14 mg patch dermal q day     Polysubstance dependence in early full remission: pt counseled  -encouraged abstinence      Psychosocial stressors: pt counseled  -SW consulted to assist with resources      Chronic pain: pt counseled  - Gabapentin 300 mg TID, titrate as indicated    PRESCRIPTION DRUG MANAGEMENT  Compliance: yes  Side Effects: no  Regimen Adjustments: see above     Discussed diagnosis, risks and benefits of proposed treatment vs alternative treatments vs no treatment, potential side effects of these treatments and the inherent unpredictability of treatment. The patient expresses understanding of the above and displays the capacity to agree with this treatment given said understanding. Patient also agrees that, currently, the benefits outweigh the risks and would like to pursue/continue treatment at this time.        DIAGNOSTIC TESTING  Labs reviewed with patient; follow up pending labs     Disposition:  -Will attempt to obtain outside psychiatric records if available  -SW to assist with aftercare planning and collateral  -Once stable discharge home with outpatient follow up care and/or  rehab  -Continue inpatient treatment under a PEC and/or CEC for danger to self/ danger to others/grave disability as evident by danger to self and gravely disabled    STAFF:   Rick Erickson MD  Psychiatry

## 2022-05-01 NOTE — PLAN OF CARE
POC reviewed and ongoing. Pt was in better spirits because he was able to speak to one of his daughters he hasn't been able to reach. He says he still has some depression and a lot of anxiety. Pt discussed with writer, that he does realize the mistakes he has made and wants to get better. He is sleeping better and that seems to be helping him. Denied SI/HI/AVH. Will continue to monitor for safety and any changes.

## 2022-05-01 NOTE — PSYCH
Community Group  Time:[id number] am  Short Term Goal: Group  Patient Response: Patient did not attend group

## 2022-05-02 PROCEDURE — 11400000 HC PSYCH PRIVATE ROOM

## 2022-05-02 PROCEDURE — 25000003 PHARM REV CODE 250: Performed by: PSYCHIATRY & NEUROLOGY

## 2022-05-02 PROCEDURE — 90833 PSYTX W PT W E/M 30 MIN: CPT | Mod: SA,HB,, | Performed by: PSYCHIATRY & NEUROLOGY

## 2022-05-02 PROCEDURE — 99232 PR SUBSEQUENT HOSPITAL CARE,LEVL II: ICD-10-PCS | Mod: SA,HB,, | Performed by: PSYCHIATRY & NEUROLOGY

## 2022-05-02 PROCEDURE — S4991 NICOTINE PATCH NONLEGEND: HCPCS | Performed by: PSYCHIATRY & NEUROLOGY

## 2022-05-02 PROCEDURE — 90833 PR PSYCHOTHERAPY W/PATIENT W/E&M, 30 MIN (ADD ON): ICD-10-PCS | Mod: SA,HB,, | Performed by: PSYCHIATRY & NEUROLOGY

## 2022-05-02 PROCEDURE — 99232 SBSQ HOSP IP/OBS MODERATE 35: CPT | Mod: SA,HB,, | Performed by: PSYCHIATRY & NEUROLOGY

## 2022-05-02 RX ORDER — MIRTAZAPINE 15 MG/1
30 TABLET, FILM COATED ORAL NIGHTLY
Status: DISCONTINUED | OUTPATIENT
Start: 2022-05-02 | End: 2022-05-05 | Stop reason: HOSPADM

## 2022-05-02 RX ORDER — GABAPENTIN 400 MG/1
400 CAPSULE ORAL 3 TIMES DAILY
Status: DISCONTINUED | OUTPATIENT
Start: 2022-05-02 | End: 2022-05-04

## 2022-05-02 RX ADMIN — GABAPENTIN 400 MG: 400 CAPSULE ORAL at 08:05

## 2022-05-02 RX ADMIN — GABAPENTIN 300 MG: 300 CAPSULE ORAL at 08:05

## 2022-05-02 RX ADMIN — HYDROXYZINE PAMOATE 50 MG: 50 CAPSULE ORAL at 08:05

## 2022-05-02 RX ADMIN — FOLIC ACID 1 MG: 1 TABLET ORAL at 08:05

## 2022-05-02 RX ADMIN — THERA TABS 1 TABLET: TAB at 08:05

## 2022-05-02 RX ADMIN — MIRTAZAPINE 30 MG: 15 TABLET, FILM COATED ORAL at 08:05

## 2022-05-02 RX ADMIN — GABAPENTIN 400 MG: 400 CAPSULE ORAL at 02:05

## 2022-05-02 RX ADMIN — NICOTINE 1 PATCH: 14 PATCH, EXTENDED RELEASE TRANSDERMAL at 08:05

## 2022-05-02 RX ADMIN — Medication 6 MG: at 08:05

## 2022-05-02 RX ADMIN — VENLAFAXINE HYDROCHLORIDE 75 MG: 75 CAPSULE, EXTENDED RELEASE ORAL at 08:05

## 2022-05-02 NOTE — PLAN OF CARE
Patient is alert and oriented, pleasant, and cooperative. Appetite is good and medication compliant without side effects noted. Patient spends most of the day out of room, ambulates on unit, and engages with peers, and playing board games. Patient endorses depression and anxiety is improving, c/o sleep at . Patient has been on the phone on and off today calling people for a place to stay upon discharge which may be contributing to anxiety. Patient did attend all groups offered today. Patient enjoying music therapy at this time. Close observations continued and safe environment maintained.

## 2022-05-02 NOTE — HISTORICAL [facility]
This is a historical note converted from [facility]. Formatting and pictures may have been removed.  Please reference Candice for original formatting and attached multimedia. Chief Complaint  5 week s/p Lt Humerus Sx-2/25/19--Gl--5/6/19. Complains of pain radiating down his left arm, pain with movement, states he takes otc medication.  History of Present Illness  2/5/2019: ORIF left humeral shaft  ?   He returns today. He has had some pain that is noticed over his?clavicle especially with forward flexion and abduction.  Review of Systems  Comprehensive review of system?was performed with no exceptions other than noted in the history of present illness  Physical Exam  Vitals & Measurements  HR:?79(Peripheral)? BP:?122/72?  HT:?167.6?cm? WT:?67.1?kg? BMI:?23.89?  Gen: WN, WD, NAD  Card/Res: NL breathing, +distal pulses  Abdomen: ND  Muscle skeletal: He has some tenderness palpation over his distal clavicle?with a palpable callus. ?He is got forward flexion to 120 degrees,?abduction?90 degrees, external rotation?60 degrees  Assessment/Plan  1.?Clavicle fracture?S42.009A  ?Interval healing noted on radiograph.? Continue activity modification.  ?  Humerus fracture?S42.309A  Improving status post above.? We will keep him off work over the next 6 weeks. ?I will see him back in?6 weeks with radiographs of his left humerus and left clavicle.? Plan to return to work at that point  Ordered:  Clinic Follow up, *Est. 04/29/19 3:00:00 CDT, Order for future visit, Humerus fracture, [facility]  Post-Op follow-up visit [id number] PC, Humerus fracture, [facility], 03/18/19 11:08:00 CDT  Treat clavicle fracture [id number] PC, 03/18/19 11:15:00 CDT, [facility], Routine, 03/18/19 11:15:00 CDT  ?   Problem List/Past Medical History  Ongoing  No qualifying data  Historical  No qualifying data  Procedure/Surgical History  ORIF Humerus (Left) (02/05/2019)   Medications  Percocet 5/325, Oral, q6hr,? ?Not taking  Allergies  No  Known Allergies  No Known Medication Allergies  Social History  Alcohol  Current, 1-2 times per month, 02/04/2019  Substance Abuse  Current, Cocaine, 02/04/2019  Tobacco  Smoker, current status unknown, No, 03/18/2019  Smoker, current status unknown, No, 02/18/2019  Smoker, current status unknown, Cigarettes, No, 02/04/2019  Family History  Family history is negative  Immunizations  Vaccine Date Status   tetanus/diphtheria/pertussis, acel(Tdap) 02/04/2019 Given   Health Maintenance  Health Maintenance  ???Pending?(in the next year)  ??? ??OverDue  ??? ? ? ?Diabetes Screening due??and every?  ??? ??Due?  ??? ? ? ?ADL Screening due??03/18/19??and every 1??year(s)  ??? ? ? ?Alcohol Misuse Screening due??03/18/19??and every 1??year(s)  ??? ? ? ?Lipid Screening due??03/18/19??and every?  ??? ? ? ?Smoking Cessation due??03/18/19??Variable frequency  ??? ??Due In Future?  ??? ? ? ?Aspirin Therapy for CVD Prevention not due until??02/06/20??and every 1??year(s)  ??? ? ? ?Blood Pressure Screening not due until??02/18/20??and every 1??year(s)  ??? ? ? ?Body Mass Index Check not due until??02/18/20??and every 1??year(s)  ??? ? ? ?Depression Screening not due until??02/18/20??and every 1??year(s)  ???Satisfied?(in the past 1 year)  ??? ??Satisfied?  ??? ? ? ?Aspirin Therapy for CVD Prevention on??02/06/19.??Satisfied by Blanca Clements RN  ??? ? ? ?Blood Pressure Screening on??03/18/19.??Satisfied by Le Watson  ??? ? ? ?Body Mass Index Check on??03/18/19.??Satisfied by Le Watson  ??? ? ? ?Depression Screening on??02/18/19.??Satisfied by Le Watson  ??? ? ? ?Diabetes Screening on??02/04/19.??Satisfied by Nelda Delarosa  ??? ? ? ?Influenza Vaccine on??02/18/19.??Satisfied by Le Watson  ??? ? ? ?Obesity Screening on??03/18/19.??Satisfied by Le Watson  ??? ? ? ?Tetanus Vaccine on??02/04/19.??Satisfied by Dana DIETRICH, Cyndi  ?  ?  Diagnostic  Results  Clavicle radiographs?and humerus radiographs 3/18/2019:?Multiple views of both show a?interval healing.     [1]?Office Visit Note; Sarika SCHMID MD, Nic OBREGON 02/18/2019 12:32 CST   Clavicle radiographs showed a distal third?clavicle shaft fracture. ?This is an oblique fracture. ?This occurred at his initial?traumatic episode.? The fracture itself is well aligned and I see some early callus formation superiorly.? We will keep him minimal weightbearing and range of motion restrictions while this finishes healing.? I will see him back in 6 weeks with repeat radiographs

## 2022-05-02 NOTE — HISTORICAL [facility]ER
This is a historical note converted from Candice. Formatting and pictures may have been removed.  Please reference Candice for original formatting and attached multimedia. Chief Complaint  PT. REPORTS THAT HE HAS BEEN HAVING SHOULDER PAIN FOR 1-2 WEEKS. ?XRAY DONE TODAY. SX: 2/5/19. ?PT. IS NOT TAKING ANYTHING FOR THE PAIN....SB  History of Present Illness  2/5/2019: ORIF left humeral shaft  ?   He returns today. He is doing some light work with his father. ?He is noticed some pain particular towards the end of the day. ?He also notices some associated weakness.  Review of Systems  Comprehensive review of system?was performed with no exceptions other than noted in the history of present illness  Physical Exam  Vitals & Measurements  HT:?167.6?cm? WT:?67.1?kg? BMI:?23.89?  Gen: WN, WD, NAD  Card/Res: NL breathing, +distal pulses  Abdomen: ND  Shoulder Exam:??????????Right??????????Left  Skin:??????????????????????????????Normal???????Normal  AC joint tenderness:??????????None??????????None  Forward Flexion:?????????????180 ??????????180  Abduction:?????????????????????180??????????? 180  External Rotation: ????????????? 80??????????????80  Internal Rotation?????????????? 80 ???????????? 80  Supraspinatus stress test?????? Neg??????????+  Roche Impingement:?? ?????Neg ?????????? ?Neg  Neer Impingement:?????????????Neg??????????Neg  Apprehension:???????????????????? Neg??????????Neg  OBriens:????????????????????????????Neg????????? Neg  Speeds test:??????????????????????? Neg??????????Neg  Strength:  External Rotation:???????????????5/5???????????????4/5  Lift Off/belly press:????????????5/5???????????????5/5  ?   N-V status:?????????????????????????Intact??????????Intact  ?   C-spine: Normal ROM, NT  ?  ?  Assessment/Plan  Closed fracture of clavicle with routine healing?S42.009D  Ordered:  Post-Op follow-up visit [id number] PC, Closed fracture of clavicle with routine healing  Closed fracture of humerus with routine  healing, [facility], 05/08/19 14:50:00 CDT  ?  Closed fracture of humerus with routine healing?S42.309D  ? Interval healing of both the humerus and the clavicle. ?I like for him to work on strengthening and endurance over the next few months. ?He can return to work at light duty?currently. ?I think he be ready to go for full duty in July. ?Provided with some notes. ?I will see him back if he has any issues  Ordered:  Post-Op follow-up visit [id number] PC, Closed fracture of clavicle with routine healing  Closed fracture of humerus with routine healing, [facility], 05/08/19 14:50:00 CDT  ?  Orders:  Clinic Follow-up PRN, 05/08/19 14:50:00 CDT, Future Order, [facility]  Referrals  Clinic Follow-up PRN, 05/08/19 14:50:00 CDT, [facility]   Problem List/Past Medical History  Ongoing  No qualifying data  Historical  No qualifying data  Procedure/Surgical History  ORIF Humerus (Left) (02/05/2019)  Reposition Left Humeral Shaft with Internal Fixation Device, Open Approach (02/05/2019)  Reposition Left Humeral Shaft, External Approach (02/04/2019)   Medications  Percocet 5/325, Oral, q6hr,? ?Not taking  Allergies  No Known Medication Allergies  Social History  Alcohol  Current, 1-2 times per month, 02/04/2019  Substance Abuse  Current, Cocaine, 02/04/2019  Tobacco  4 or less cigarettes(less than 1/4 pack)/day in last 30 days, No, 05/08/2019  Family History  Family history is negative  Immunizations  Vaccine Date Status   tetanus/diphtheria/pertussis, acel(Tdap) 02/04/2019 Given   Health Maintenance  Health Maintenance  ???Pending?(in the next year)  ??? ??OverDue  ??? ? ? ?Diabetes Screening due??and every?  ??? ? ? ?Alcohol Misuse Screening due??01/01/19??and every 1??year(s)  ??? ? ? ?Smoking Cessation due??01/01/19??Variable frequency  ??? ??Due?  ??? ? ? ?ADL Screening due??05/08/19??and every 1??year(s)  ??? ? ? ?Lipid Screening due??05/08/19??and every?  ??? ??Due In Future?  ???  ? ? ?Obesity Screening not due until??01/01/20??and every 1??year(s)  ??? ? ? ?Aspirin Therapy for CVD Prevention not due until??02/06/20??and every 1??year(s)  ??? ? ? ?Depression Screening not due until??02/18/20??and every 1??year(s)  ??? ? ? ?Blood Pressure Screening not due until??03/17/20??and every 1??year(s)  ??? ? ? ?Body Mass Index Check not due until??03/17/20??and every 1??year(s)  ???Satisfied?(in the past 1 year)  ??? ??Satisfied?  ??? ? ? ?Aspirin Therapy for CVD Prevention on??02/06/19.??Satisfied by Blanca Clements RN  ??? ? ? ?Blood Pressure Screening on??03/18/19.??Satisfied by Le Watson  ??? ? ? ?Body Mass Index Check on??05/08/19.??Satisfied by Yue Sifuentes  ??? ? ? ?Depression Screening on??02/18/19.??Satisfied by Le Watson  ??? ? ? ?Diabetes Screening on??02/04/19.??Satisfied by Nelda Delarosa  ??? ? ? ?Influenza Vaccine on??02/18/19.??Satisfied by Le Watson  ??? ? ? ?Obesity Screening on??05/08/19.??Satisfied by Yue Sifuentes  ??? ? ? ?Tetanus Vaccine on??02/04/19.??Satisfied by Dana DIETRICH, Cyndi  ?  ?  Diagnostic Results  Humerus and shoulder radiographs show?healed fractures.     [1]?Office Visit Note; Sarika SCHMID MD, Nic OBREGON 03/18/2019 11:08 CDT

## 2022-05-02 NOTE — PROGRESS NOTES
"PSYCHIATRY DAILY INPATIENT PROGRESS NOTE  SUBSEQUENT HOSPITAL VISIT    ENCOUNTER DATE: 5/2/2022  SITE: [facility]    DATE OF ADMISSION: 4/27/2022 11:16 AM  LENGTH OF STAY: 5 days    CHIEF COMPLAINT   Leyla Rayo is a 51 y.o. male, seen during daily abreu rounds on the inpatient unit.  Leyla Rayo presented with the chief complaint of "high anxiety and taking drugs", depression    The patient was seen and examined. The chart was reviewed.     Reviewed notes from Rns and MD and labs from the last 24 hours.    The patient's case was discussed with the treatment team/care providers today including Rns    Staff reports no behavioral or management issues.     The patient has been compliant with treatment.    Subjective 05/02/2022     Today the patient reports some improvement in symptoms, states "I'm feeling much better than I did when I first came in. Still a little depressed and anxious though." Affect is brighter compared to last assessment. Patient expressed concern that "They increased my effexor but I don't really feel any less depressed." Pt counseled and informed that SNRIs may take several weeks to reach full efficacy. Verbalized understanding. Complains that he is still having some trouble with sleep initiation and maintenance, noting "I woke up a few times last night and had some trouble falling back asleep."     Patient in the process of determining where he will go post-discharge. States that he has been on the phone this morning talking to friends to try and find a place to stay. Encouraged pt to remain in contact with SW and discharge planner.    Complains of continuing bilateral neuropathic nerve pain, currently rated "5 or 6 out of 10."     The patient denies any side effects to medications.    Psychiatric ROS (observed, reported, or endorsed/denied):  Depressed mood - less  Interest/pleasure/anhedonia: less  Guilt/hopelessness/worthlessness - less  Changes in Sleep - Continuing  Changes in " Appetite - denies  Changes in Concentration - less  Changes in Energy - less  PMA/R- denies  Suicidal- active/passive ideations - less  Homicidal ideations: active/passive ideations - denies    Hallucinations - denies  Delusions - denies  Disorganized behavior - denies  Disorganized speech - denies  Negative symptoms - denies    Elevated mood - denies  Decreased need for sleep - denies  Grandiosity - denies  Racing thoughts - denies  Impulsivity - denies  Irritability- denies  Increased energy - denies  Distractibility - denies  Increase in goal-directed activity or PMA- denies    Symptoms of FREEMAN - improving minimally  Symptoms of Panic Disorder- denies  Symptoms of PTSD - denies    Overall progress: Patient is showing moderate improvement    Psychotherapy:  · Target symptoms: depression, anxiety   · Why chosen therapy is appropriate versus another modality: relevant to diagnosis, evidence based practice  · Outcome monitoring methods: self-report, observation  · Therapeutic intervention type: insight oriented psychotherapy, supportive psychotherapy  · Topics discussed/themes: building skills sets for symptom management, symptom recognition  · The patient's response to the intervention is accepting. The patient's progress toward treatment goals is good.   · Duration of intervention: 16 minutes.    Medical ROS  Review of Systems   Constitutional: Negative.    HENT: Negative.    Eyes: Negative.    Respiratory: Negative.    Cardiovascular: Negative.    Gastrointestinal: Negative.    Genitourinary: Negative.    Musculoskeletal:        Bilateral chronic hand pain   Skin: Negative.    Neurological: Negative.    Endo/Heme/Allergies: Negative.    Psychiatric/Behavioral:        As noted       PAST MEDICAL HISTORY   Past Medical History:   Diagnosis Date    History of psychiatric hospitalization     Insomnia     Insomnia     Psychiatric problem        PSYCHOTROPIC MEDICATIONS   Scheduled Meds:   folic acid  1 mg Oral  "Daily    gabapentin  300 mg Oral TID    mirtazapine  15 mg Oral QHS    multivitamin  1 tablet Oral Daily    nicotine  1 patch Transdermal Daily    venlafaxine  75 mg Oral Daily     Continuous Infusions:  PRN Meds:.acetaminophen, aluminum-magnesium hydroxide-simethicone, docusate sodium, hydrOXYzine pamoate, loperamide, melatonin, OLANZapine **AND** OLANZapine    EXAMINATION    VITALS   Vitals:    04/30/22 1913 05/01/22 0736 05/01/22 1916 05/02/22 0812   BP: 125/80 127/77 117/67 106/73   BP Location: Left arm Right arm Left arm Right arm   Patient Position: Sitting  Sitting Sitting   Pulse: (!) 119 (!) 112 106 94   Resp: 20 20 20 18   Temp: 99.5 °F (37.5 °C) 99 °F (37.2 °C) 98.1 °F (36.7 °C) 97.8 °F (36.6 °C)   TempSrc: Oral Oral Oral Oral   SpO2: (!) 94% 98% 98% 99%   Weight:       Height:           Body mass index is 23.81 kg/m².    CONSTITUTIONAL  General Appearance: unremarkable, age appropriate    MUSCULOSKELETAL  Muscle Strength and Tone:no tremor, no tic  Abnormal Involuntary Movements: No  Gait and Station: non-ataxic    PSYCHIATRIC   Level of Consciousness: awake, alert  and oriented  Orientation: person, place and situation  Grooming: Casually dressed and Well groomed  Psychomotor Behavior: normal, cooperative, friendly and cooperative  Speech: normal tone, normal rate, normal pitch, normal volume  Language: grossly intact  Mood: "Better than I was"  Affect: Consistent with mood and Congruent with thought  Thought Process: linear, logical  Associations: intact   Thought Content: DENIES suicidal ideation, DENIES homicidal ideation and no delusions  Perceptions: denies hallucinations  Memory: Able to recall past events, Remote intact and Recent intact  Attention:Attends to interview without distraction  Fund of Knowledge: Aware of current events and Vocabulary appropriate   Estimate if Intelligence:  Average based on work/education history, vocabulary and mental status exam  Insight: has awareness of " illness  Judgment: behavior is adequate to circumstances    DIAGNOSTIC TESTING   Laboratory Results  No results found for this or any previous visit (from the past 24 hour(s)).    MEDICAL DECISION MAKING   MDD, recurrent, severe without psychotic features  Unspecified Anxiety Disorder  PTSD     Nicotine Dependence  Polysubstance dependence in early full remission     Psychosocial stressors     Chronic pain        PROBLEM LIST AND MANAGEMENT PLANS     Depression: pt counseled  -start trial of Effexor XR at 37.5 mg po q day --> Titrate to 75mg PO daily on 4/30  -Continue effexor 75 mg today, may titrate further prior to discharge  -Increase remeron to 30 mg qhs    Anxiety: pt counseled  -meds as above  -vistaril prn  -Gabapentin off label as below     PTSD: pt counseled  -meds as above  -consider trial of prazosin     Nicotine Dependence: pt counseled  -start nicotine 14 mg patch dermal q day     Polysubstance dependence in early full remission: pt counseled  -encouraged abstinence      Psychosocial stressors: pt counseled  -SW consulted to assist with resources      Chronic pain: pt counseled  -Increase gabapentin to 400 mg TID-titrate as indicated    PRESCRIPTION DRUG MANAGEMENT  Compliance: yes  Side Effects: no  Regimen Adjustments: see above     Discussed diagnosis, risks and benefits of proposed treatment vs alternative treatments vs no treatment, potential side effects of these treatments and the inherent unpredictability of treatment. The patient expresses understanding of the above and displays the capacity to agree with this treatment given said understanding. Patient also agrees that, currently, the benefits outweigh the risks and would like to pursue/continue treatment at this time.        DIAGNOSTIC TESTING  Labs reviewed with patient; follow up pending labs     Disposition:  -Will attempt to obtain outside psychiatric records if available  -SW to assist with aftercare planning and collateral  -Once  stable discharge home with outpatient follow up care and/or rehab  -Continue inpatient treatment under a PEC and/or CEC for danger to self/ danger to others/grave disability as evident by danger to self and gravely disabled  STAFF:   Jonnie Lagos NP  Psychiatry

## 2022-05-02 NOTE — PLAN OF CARE
POC reviewed and ongoing. Pt states that he is feeling better, rates depression 5/10 and anxiety 3/10. Denies SI/HI/AVH this shift. Pt admits to writer that he understands that he needs treatment of some type to get better. And he feels the medication is definitely helping him. Will continue to monitor for safety and any changes.

## 2022-05-03 PROCEDURE — 90833 PR PSYCHOTHERAPY W/PATIENT W/E&M, 30 MIN (ADD ON): ICD-10-PCS | Mod: SA,HB,, | Performed by: PSYCHIATRY & NEUROLOGY

## 2022-05-03 PROCEDURE — 25000003 PHARM REV CODE 250: Performed by: PSYCHIATRY & NEUROLOGY

## 2022-05-03 PROCEDURE — S4991 NICOTINE PATCH NONLEGEND: HCPCS | Performed by: PSYCHIATRY & NEUROLOGY

## 2022-05-03 PROCEDURE — 90833 PSYTX W PT W E/M 30 MIN: CPT | Mod: SA,HB,, | Performed by: PSYCHIATRY & NEUROLOGY

## 2022-05-03 PROCEDURE — 99232 PR SUBSEQUENT HOSPITAL CARE,LEVL II: ICD-10-PCS | Mod: SA,HB,, | Performed by: PSYCHIATRY & NEUROLOGY

## 2022-05-03 PROCEDURE — 11400000 HC PSYCH PRIVATE ROOM

## 2022-05-03 PROCEDURE — 99232 SBSQ HOSP IP/OBS MODERATE 35: CPT | Mod: SA,HB,, | Performed by: PSYCHIATRY & NEUROLOGY

## 2022-05-03 RX ADMIN — MIRTAZAPINE 30 MG: 15 TABLET, FILM COATED ORAL at 08:05

## 2022-05-03 RX ADMIN — HYDROXYZINE PAMOATE 50 MG: 50 CAPSULE ORAL at 08:05

## 2022-05-03 RX ADMIN — THERA TABS 1 TABLET: TAB at 08:05

## 2022-05-03 RX ADMIN — NICOTINE 1 PATCH: 14 PATCH, EXTENDED RELEASE TRANSDERMAL at 08:05

## 2022-05-03 RX ADMIN — VENLAFAXINE HYDROCHLORIDE 75 MG: 75 CAPSULE, EXTENDED RELEASE ORAL at 08:05

## 2022-05-03 RX ADMIN — GABAPENTIN 400 MG: 400 CAPSULE ORAL at 08:05

## 2022-05-03 RX ADMIN — FOLIC ACID 1 MG: 1 TABLET ORAL at 08:05

## 2022-05-03 RX ADMIN — Medication 6 MG: at 08:05

## 2022-05-03 RX ADMIN — GABAPENTIN 400 MG: 400 CAPSULE ORAL at 04:05

## 2022-05-03 NOTE — PROGRESS NOTES
"PSYCHIATRY DAILY INPATIENT PROGRESS NOTE  SUBSEQUENT HOSPITAL VISIT    ENCOUNTER DATE: 5/3/2022  SITE: [facility]    DATE OF ADMISSION: 4/27/2022 11:16 AM  LENGTH OF STAY: 6 days    CHIEF COMPLAINT   Leyla Rayo is a 51 y.o. male, seen during daily abreu rounds on the inpatient unit.  Leyla Rayo presented with the chief complaint of depression and suicidal ideation    The patient was seen and examined. The chart was reviewed.     Reviewed notes from Rns and MD and labs from the last 24 hours.    The patient's case was discussed with the treatment team/care providers today including Rns and MD    Staff reports no behavioral or management issues.     The patient has been compliant with treatment.    Subjective 05/03/2022    Patient continues to report improvement. States today " I haven't really been having much anxiety at all today." Reports that he feels his general "outlook is brighter." Discharge plan remains questionable. Patient stated today "Well, I'm just going to walk out of here and go to my mom's house." However, he stated that as of time of this writing, he had not discussed this plan with his mother. Patent will coordinate with discharge planner today to determine course of action.     The patient denies any side effects to medications.    Psychiatric ROS (observed, reported, or endorsed/denied):  Depressed mood - less  Interest/pleasure/anhedonia: less  Guilt/hopelessness/worthlessness - less  Changes in Sleep - denies  Changes in Appetite - denies  Changes in Concentration - denies  Changes in Energy - denies  PMA/R- denies  Suicidal- active/passive ideations - less  Homicidal ideations: active/passive ideations - denies    Hallucinations - denies  Delusions - denies  Disorganized behavior - denies  Disorganized speech - denies  Negative symptoms - denies    Elevated mood - denies  Decreased need for sleep - denies  Grandiosity - denies  Racing thoughts - denies  Impulsivity - " denies  Irritability- denies  Increased energy - denies  Distractibility - denies  Increase in goal-directed activity or PMA- denies    Symptoms of FREEMAN - decreasing steadily  Symptoms of Panic Disorder- decreasing steadily  Symptoms of PTSD - denies    Overall progress: Patient is showing moderate improvement    Psychotherapy:  · Target symptoms: depression, anxiety   · Why chosen therapy is appropriate versus another modality: relevant to diagnosis, evidence based practice  · Outcome monitoring methods: self-report, observation  · Therapeutic intervention type: insight oriented psychotherapy, supportive psychotherapy  · Topics discussed/themes: relationships difficulties, difficulty managing affect in interpersonal relationships, building skills sets for symptom management  · The patient's response to the intervention is accepting. The patient's progress toward treatment goals is good.   · Duration of intervention: 16 minutes.    Medical ROS  Review of Systems   Constitutional: Negative.    HENT: Negative.    Eyes: Negative.    Respiratory: Negative.    Cardiovascular: Negative.    Gastrointestinal: Negative.    Genitourinary: Negative.    Musculoskeletal: Negative.         Chronic neuropathic hand pain...rated 3/10 today   Skin: Negative.    Neurological: Negative.    Endo/Heme/Allergies: Negative.        PAST MEDICAL HISTORY   Past Medical History:   Diagnosis Date    History of psychiatric hospitalization     Insomnia     Insomnia     Psychiatric problem        PSYCHOTROPIC MEDICATIONS   Scheduled Meds:   folic acid  1 mg Oral Daily    gabapentin  400 mg Oral TID    mirtazapine  30 mg Oral QHS    multivitamin  1 tablet Oral Daily    nicotine  1 patch Transdermal Daily    venlafaxine  75 mg Oral Daily     Continuous Infusions:  PRN Meds:.acetaminophen, aluminum-magnesium hydroxide-simethicone, docusate sodium, hydrOXYzine pamoate, loperamide, melatonin, OLANZapine **AND**  "OLANZapine    EXAMINATION    VITALS   Vitals:    05/01/22 1916 05/02/22 0812 05/02/22 1930 05/03/22 0800   BP: 117/67 106/73 122/64 (!) 144/80   BP Location: Left arm Right arm Right arm Right arm   Patient Position: Sitting Sitting Sitting Sitting   Pulse: 106 94 101 97   Resp: 20 18 20 18   Temp: 98.1 °F (36.7 °C) 97.8 °F (36.6 °C) 98.6 °F (37 °C) 98.3 °F (36.8 °C)   TempSrc: Oral Oral Oral Oral   SpO2: 98% 99% 95% 97%   Weight:       Height:           Body mass index is 23.81 kg/m².    CONSTITUTIONAL  General Appearance: unremarkable, age appropriate, normal weight    MUSCULOSKELETAL  Muscle Strength and Tone:no tremor, no tic  Abnormal Involuntary Movements: No  Gait and Station: non-ataxic    PSYCHIATRIC   Level of Consciousness: awake, alert  and oriented  Orientation: person, place, time, situation, day of week, month of year and year  Grooming: Casually dressed and Well groomed  Psychomotor Behavior: normal, cooperative, friendly and cooperative  Speech: normal tone, normal rate, normal pitch, normal volume  Language: grossly intact  Mood: "better"  Affect: Consistent with mood and Congruent with thought  Thought Process: linear, logical  Associations: intact   Thought Content: DENIES suicidal ideation, DENIES homicidal ideation and no delusions  Perceptions: denies hallucinations  Memory: Able to recall past events, Remote intact and Recent intact  Attention:Attends to interview without distraction  Fund of Knowledge: Aware of current events, Intact and Vocabulary appropriate   Estimate if Intelligence:  Average based on work/education history, vocabulary and mental status exam  Insight: has awareness of illness  Judgment: behavior is adequate to circumstances    DIAGNOSTIC TESTING   Laboratory Results  No results found for this or any previous visit (from the past 24 hour(s)).    MEDICAL DECISION MAKING     MDD, recurrent, severe without psychotic features  Unspecified Anxiety Disorder  PTSD     Nicotine " Dependence  Polysubstance dependence in early full remission     Psychosocial stressors     Chronic pain        PROBLEM LIST AND MANAGEMENT PLANS     Depression: pt counseled  -start trial of Effexor XR at 37.5 mg po q day --> Titrate to 75mg PO daily on 4/30  -Continue effexor 75 mg today, may titrate further prior to discharge  -Continue remeron 30 mg QHS     Anxiety: pt counseled  -meds as above  -vistaril prn  -Gabapentin off label as below     PTSD: pt counseled  -meds as above  -consider trial of prazosin     Nicotine Dependence: pt counseled  -start nicotine 14 mg patch dermal q day     Polysubstance dependence in early full remission: pt counseled  -encouraged abstinence      Psychosocial stressors: pt counseled  -SW consulted to assist with resources      Chronic pain: pt counseled  -Continue gabapentin 400 mg TID    PRESCRIPTION DRUG MANAGEMENT  Compliance: yes  Side Effects: no  Regimen Adjustments: see above     Discussed diagnosis, risks and benefits of proposed treatment vs alternative treatments vs no treatment, potential side effects of these treatments and the inherent unpredictability of treatment. The patient expresses understanding of the above and displays the capacity to agree with this treatment given said understanding. Patient also agrees that, currently, the benefits outweigh the risks and would like to pursue/continue treatment at this time.        DIAGNOSTIC TESTING  Labs reviewed with patient; follow up pending labs     Disposition:  -Will attempt to obtain outside psychiatric records if available  -SW to assist with aftercare planning and collateral  -Once stable discharge home with outpatient follow up care and/or rehab  -Continue inpatient treatment under a PEC and/or CEC for danger to self/ danger to others/grave disability as evident by danger to self and gravely disabled    STAFF:   Jonnie Lagos NP  Psychiatry

## 2022-05-03 NOTE — PLAN OF CARE
"Patient is alert and oriented. Accepting meals with good appetite and medication compliant. Patient is anxious today, appears sad, and expressing frustration with "trying to locate all my stuff, and my family is messing me up, and I'm worried about my girlfriend". Denies S/HI, A/VH. Has been on telephone today with family members/ friends. Engages with peers and staff ad colton. No negative behaviors and remains cooperative. Patient requesting to speak to  and did upon request. Patient is sitting in the dining room at this time eating the rest of his lunch. Close observations continued and safe environment maintained.  "

## 2022-05-03 NOTE — PLAN OF CARE
No major changes from day shift.  Patient continues to admit to depression and denies all other ideations.  Cooperative and calm.  Out of room for meals and snacks.  Selective peer engagement.  Patient sleeping through the night.

## 2022-05-03 NOTE — PLAN OF CARE
Pt requested individual session to discuss family dynamics.  Pt expressed his concerns about family dynamics and processed possible solutions with .

## 2022-05-04 PROCEDURE — 90833 PSYTX W PT W E/M 30 MIN: CPT | Mod: AF,HB,, | Performed by: STUDENT IN AN ORGANIZED HEALTH CARE EDUCATION/TRAINING PROGRAM

## 2022-05-04 PROCEDURE — 25000003 PHARM REV CODE 250: Performed by: STUDENT IN AN ORGANIZED HEALTH CARE EDUCATION/TRAINING PROGRAM

## 2022-05-04 PROCEDURE — 25000003 PHARM REV CODE 250: Performed by: PSYCHIATRY & NEUROLOGY

## 2022-05-04 PROCEDURE — 99233 PR SUBSEQUENT HOSPITAL CARE,LEVL III: ICD-10-PCS | Mod: AF,HB,, | Performed by: STUDENT IN AN ORGANIZED HEALTH CARE EDUCATION/TRAINING PROGRAM

## 2022-05-04 PROCEDURE — 99233 SBSQ HOSP IP/OBS HIGH 50: CPT | Mod: AF,HB,, | Performed by: STUDENT IN AN ORGANIZED HEALTH CARE EDUCATION/TRAINING PROGRAM

## 2022-05-04 PROCEDURE — 11400000 HC PSYCH PRIVATE ROOM

## 2022-05-04 PROCEDURE — S4991 NICOTINE PATCH NONLEGEND: HCPCS | Performed by: PSYCHIATRY & NEUROLOGY

## 2022-05-04 PROCEDURE — 90833 PR PSYCHOTHERAPY W/PATIENT W/E&M, 30 MIN (ADD ON): ICD-10-PCS | Mod: AF,HB,, | Performed by: STUDENT IN AN ORGANIZED HEALTH CARE EDUCATION/TRAINING PROGRAM

## 2022-05-04 RX ORDER — GABAPENTIN 300 MG/1
600 CAPSULE ORAL 3 TIMES DAILY
Status: DISCONTINUED | OUTPATIENT
Start: 2022-05-04 | End: 2022-05-05 | Stop reason: HOSPADM

## 2022-05-04 RX ORDER — HYDROXYZINE PAMOATE 50 MG/1
100 CAPSULE ORAL EVERY 6 HOURS PRN
Status: DISCONTINUED | OUTPATIENT
Start: 2022-05-04 | End: 2022-05-05 | Stop reason: HOSPADM

## 2022-05-04 RX ADMIN — THERA TABS 1 TABLET: TAB at 09:05

## 2022-05-04 RX ADMIN — VENLAFAXINE HYDROCHLORIDE 75 MG: 75 CAPSULE, EXTENDED RELEASE ORAL at 09:05

## 2022-05-04 RX ADMIN — GABAPENTIN 600 MG: 300 CAPSULE ORAL at 08:05

## 2022-05-04 RX ADMIN — MIRTAZAPINE 30 MG: 15 TABLET, FILM COATED ORAL at 08:05

## 2022-05-04 RX ADMIN — Medication 6 MG: at 08:05

## 2022-05-04 RX ADMIN — HYDROXYZINE PAMOATE 100 MG: 50 CAPSULE ORAL at 08:05

## 2022-05-04 RX ADMIN — GABAPENTIN 400 MG: 400 CAPSULE ORAL at 09:05

## 2022-05-04 RX ADMIN — GABAPENTIN 600 MG: 300 CAPSULE ORAL at 03:05

## 2022-05-04 RX ADMIN — NICOTINE 1 PATCH: 14 PATCH, EXTENDED RELEASE TRANSDERMAL at 09:05

## 2022-05-04 RX ADMIN — FOLIC ACID 1 MG: 1 TABLET ORAL at 09:05

## 2022-05-04 NOTE — PSYCH
Telemental Health Protocols Employed          Group Note          Behavior      Patient attended group psychotherapy this evening. Patient exhibited a significant lessening in depressive mood today. Patient exhibited an appropriate affect.         Intervention     CBT-based group psychotherapy focused this evening on internal coping strategies to consider when attempting to conceptualizing what patients could do on their own without contacting anyone else in order to address the crisis as part of the first step in implementing a behavioral health safety plan.           Response      Patient asked to be challenged and received challenging questions from the other group members about what he may have learned today. Patient appeared fascinated by the topic; some extended discussion occurred concerning the statement by another patient about exploring the full implications of not being able to be afraid to receive each day's surprise, whether it comes as sorrow or bello. Patient acknowledged that sometimes it is hard to open a new place in his heart when society or the self has conditioned someone to always respond with anxiety to any given situation or circumstance. Patient agreed that holding such a philosophy and practicing the discipline every day may help in reducing symptoms of panic. Patient was given a round of applause for participating in the discussion and for being a particularly good listener. Patient positively contributed to the group discussion by his presence and for allowing himself to be challenged by the other group members.           Plan      Patient will be encouraged to continue to attend group psychotherapy with continued work in the area of behavioral health safety planning.

## 2022-05-04 NOTE — PLAN OF CARE
POC reviewed this shift and is on going. Patient is calm, cooperative, quiet, and anxious. Denies Suicidal Ideation, Homicidal Ideation, Auditory Hallucinations, Visual Hallucinations, Tactile Hallucinations, Gustatory Hallucinations, and Delusions. Patient was anxious and agitated at times today while he was on the phone talking to someone about his possessions that are missing.  Pt continues to be medication compliant and staff will continue to monitor pt closely while on the unit.

## 2022-05-04 NOTE — PSYCH
Community Group  Time:[id number] AM  Short Term Goal: Medication Management  Patient Response: take medicine as prescribed once discharged
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
## 2022-05-04 NOTE — PLAN OF CARE
Not much change from day shift.  Patient continues to persistently talk about his ex wife to staff.  Patient however is smiling, eating adequate meals, resting, and engaging in activities with peers.  No concerns.

## 2022-05-05 VITALS
DIASTOLIC BLOOD PRESSURE: 82 MMHG | SYSTOLIC BLOOD PRESSURE: 140 MMHG | OXYGEN SATURATION: 97 % | HEIGHT: 67 IN | TEMPERATURE: 98 F | WEIGHT: 152 LBS | HEART RATE: 92 BPM | RESPIRATION RATE: 20 BRPM | BODY MASS INDEX: 23.86 KG/M2

## 2022-05-05 PROCEDURE — 99239 PR HOSPITAL DISCHARGE DAY,>30 MIN: ICD-10-PCS | Mod: AF,HB,, | Performed by: PSYCHIATRY & NEUROLOGY

## 2022-05-05 PROCEDURE — 25000003 PHARM REV CODE 250: Performed by: PSYCHIATRY & NEUROLOGY

## 2022-05-05 PROCEDURE — 90833 PR PSYCHOTHERAPY W/PATIENT W/E&M, 30 MIN (ADD ON): ICD-10-PCS | Mod: AF,HB,, | Performed by: PSYCHIATRY & NEUROLOGY

## 2022-05-05 PROCEDURE — 99239 HOSP IP/OBS DSCHRG MGMT >30: CPT | Mod: AF,HB,, | Performed by: PSYCHIATRY & NEUROLOGY

## 2022-05-05 PROCEDURE — S4991 NICOTINE PATCH NONLEGEND: HCPCS | Performed by: PSYCHIATRY & NEUROLOGY

## 2022-05-05 PROCEDURE — 25000003 PHARM REV CODE 250: Performed by: STUDENT IN AN ORGANIZED HEALTH CARE EDUCATION/TRAINING PROGRAM

## 2022-05-05 PROCEDURE — 90833 PSYTX W PT W E/M 30 MIN: CPT | Mod: AF,HB,, | Performed by: PSYCHIATRY & NEUROLOGY

## 2022-05-05 RX ORDER — GABAPENTIN 300 MG/1
600 CAPSULE ORAL 3 TIMES DAILY
Qty: 180 CAPSULE | Refills: 2 | Status: SHIPPED | OUTPATIENT
Start: 2022-05-05 | End: 2023-05-05

## 2022-05-05 RX ORDER — VENLAFAXINE HYDROCHLORIDE 75 MG/1
75 CAPSULE, EXTENDED RELEASE ORAL DAILY
Qty: 30 CAPSULE | Refills: 2 | Status: SHIPPED | OUTPATIENT
Start: 2022-05-06 | End: 2023-05-06

## 2022-05-05 RX ORDER — MIRTAZAPINE 30 MG/1
30 TABLET, FILM COATED ORAL NIGHTLY
Qty: 30 TABLET | Refills: 2 | Status: SHIPPED | OUTPATIENT
Start: 2022-05-05 | End: 2023-05-05

## 2022-05-05 RX ORDER — HYDROXYZINE PAMOATE 100 MG/1
100 CAPSULE ORAL NIGHTLY PRN
Qty: 30 CAPSULE | Refills: 2 | Status: SHIPPED | OUTPATIENT
Start: 2022-05-05

## 2022-05-05 RX ADMIN — THERA TABS 1 TABLET: TAB at 08:05

## 2022-05-05 RX ADMIN — FOLIC ACID 1 MG: 1 TABLET ORAL at 08:05

## 2022-05-05 RX ADMIN — VENLAFAXINE HYDROCHLORIDE 75 MG: 75 CAPSULE, EXTENDED RELEASE ORAL at 08:05

## 2022-05-05 RX ADMIN — NICOTINE 1 PATCH: 14 PATCH, EXTENDED RELEASE TRANSDERMAL at 08:05

## 2022-05-05 RX ADMIN — GABAPENTIN 600 MG: 300 CAPSULE ORAL at 08:05

## 2022-05-05 NOTE — PLAN OF CARE
POC reviewed and ongoing. Pt feeling better, looking forward to discharge and getting his life together. Denies SI/HI/AVH. No issues this shift. Will continue to monitor for safety and any changes.

## 2022-05-05 NOTE — PROGRESS NOTES
Psychotherapy:  · Target symptoms: depression, anxiety   · Why chosen therapy is appropriate versus another modality: relevant to diagnosis, evidence based practice  · Outcome monitoring methods: self-report, observation  · Therapeutic intervention type: insight oriented psychotherapy, supportive psychotherapy  · Topics discussed/themes: relationships difficulties, difficulty managing affect in interpersonal relationships, building skills sets for symptom management, symptom recognition  · Safety plan and wrap up session  · The patient's response to the intervention is accepting. The patient's progress toward treatment goals is good.   · Duration of intervention: 16 minutes    Kirby Whitehead MD  Psychiatry
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
## 2023-08-12 ENCOUNTER — HOSPITAL ENCOUNTER (EMERGENCY)
Facility: HOSPITAL | Age: 53
Discharge: HOME | End: 2023-08-13
Attending: EMERGENCY MEDICINE
Payer: MEDICAID

## 2023-08-12 VITALS
HEART RATE: 82 BPM | OXYGEN SATURATION: 97 % | SYSTOLIC BLOOD PRESSURE: 103 MMHG | DIASTOLIC BLOOD PRESSURE: 63 MMHG | WEIGHT: 140 LBS | BODY MASS INDEX: 21.93 KG/M2 | RESPIRATION RATE: 20 BRPM

## 2023-08-12 DIAGNOSIS — S09.90XA INJURY OF HEAD, INITIAL ENCOUNTER: Primary | ICD-10-CM

## 2023-08-12 DIAGNOSIS — S01.81XA LACERATION OF BROW WITHOUT COMPLICATION, INITIAL ENCOUNTER: ICD-10-CM

## 2023-08-12 PROCEDURE — 63600175 PHARM REV CODE 636 W HCPCS: Performed by: EMERGENCY MEDICINE

## 2023-08-12 PROCEDURE — 90471 IMMUNIZATION ADMIN: CPT | Performed by: EMERGENCY MEDICINE

## 2023-08-12 PROCEDURE — 99285 EMERGENCY DEPT VISIT HI MDM: CPT | Mod: 25

## 2023-08-12 PROCEDURE — 12011 RPR F/E/E/N/L/M 2.5 CM/<: CPT

## 2023-08-12 PROCEDURE — 90715 TDAP VACCINE 7 YRS/> IM: CPT | Performed by: EMERGENCY MEDICINE

## 2023-08-12 RX ORDER — LIDOCAINE HYDROCHLORIDE 10 MG/ML
5 INJECTION, SOLUTION EPIDURAL; INFILTRATION; INTRACAUDAL; PERINEURAL
Status: COMPLETED | OUTPATIENT
Start: 2023-08-12 | End: 2023-08-13

## 2023-08-12 RX ORDER — ACETAMINOPHEN 325 MG/1
650 TABLET ORAL
Status: COMPLETED | OUTPATIENT
Start: 2023-08-12 | End: 2023-08-13

## 2023-08-12 RX ORDER — MUPIROCIN 20 MG/G
1 OINTMENT TOPICAL
Status: COMPLETED | OUTPATIENT
Start: 2023-08-12 | End: 2023-08-13

## 2023-08-12 RX ADMIN — TETANUS TOXOID, REDUCED DIPHTHERIA TOXOID AND ACELLULAR PERTUSSIS VACCINE, ADSORBED 0.5 ML: 5; 2.5; 8; 8; 2.5 SUSPENSION INTRAMUSCULAR at 11:08

## 2023-08-13 PROCEDURE — 25000003 PHARM REV CODE 250: Performed by: EMERGENCY MEDICINE

## 2023-08-13 RX ADMIN — ACETAMINOPHEN 650 MG: 325 TABLET ORAL at 12:08

## 2023-08-13 RX ADMIN — MUPIROCIN 22 G: 20 OINTMENT TOPICAL at 12:08

## 2023-08-13 RX ADMIN — LIDOCAINE HYDROCHLORIDE 50 MG: 10 INJECTION, SOLUTION EPIDURAL; INFILTRATION; INTRACAUDAL; PERINEURAL at 12:08

## 2023-08-13 NOTE — ED PROVIDER NOTES
Encounter Date: 8/12/2023       History     Chief Complaint   Patient presents with    Head Injury     Pt presents w/ complaints of headache and vomiting after falling in his camper. Pt states he tripped over a door at unknown time, reports loc. Pt presents w/ lac above his L eye.     54 yo male with history as below here via POV with neighbor after trip and fall just PTA with LOC and laceration to L brow. Last Td unknown. Bleeding controlled PTA. No AMS. No N/V. Mild headache.       Review of patient's allergies indicates:  No Known Allergies  Past Medical History:   Diagnosis Date    History of psychiatric hospitalization     Insomnia     Insomnia     Psychiatric problem      No past surgical history on file.  No family history on file.  Social History     Tobacco Use    Smoking status: Every Day     Current packs/day: 0.50     Average packs/day: 0.5 packs/day for 25.0 years (12.5 ttl pk-yrs)     Types: Cigarettes    Smokeless tobacco: Never   Substance Use Topics    Alcohol use: Not Currently     Comment: Pt reports he is not drinking currently but does drink once every two weeks    Drug use: Not Currently     Comment: Pt reports he is not currently using drugs, but he does have a history of cocaine and meth use     Review of Systems   Constitutional: Negative.    Respiratory: Negative.     Cardiovascular: Negative.    Gastrointestinal: Negative.    All other systems reviewed and are negative.      Physical Exam     Initial Vitals [08/12/23 2209]   BP Pulse Resp Temp SpO2   103/63 82 20 -- 97 %      MAP       --         Physical Exam    Nursing note and vitals reviewed.  Constitutional: He appears well-developed and well-nourished. He is not diaphoretic. No distress.   HENT:   Head: Normocephalic.   2 cm laceration L brow. No active bleeding.    Eyes: EOM are normal. Pupils are equal, round, and reactive to light.   Neck: Neck supple.   Normal range of motion.  Cardiovascular:  Normal rate, regular rhythm and  intact distal pulses.           Pulmonary/Chest: Breath sounds normal. No respiratory distress. He has no wheezes. He has no rales.   Abdominal: Abdomen is soft. Bowel sounds are normal. He exhibits no distension. There is no abdominal tenderness. There is no rebound.   Musculoskeletal:         General: No tenderness or edema. Normal range of motion.      Cervical back: Normal range of motion and neck supple.     Neurological: He is alert and oriented to person, place, and time. GCS score is 15. GCS eye subscore is 4. GCS verbal subscore is 5. GCS motor subscore is 6.   Skin: Skin is warm and dry. Capillary refill takes less than 2 seconds.   Psychiatric: He has a normal mood and affect. Thought content normal.         ED Course   Lac Repair    Date/Time: 8/13/2023 12:27 AM    Performed by: Kirby Conteh MD  Authorized by: Kirby Conteh MD    Laceration details:     Location:  Face    Face location:  L eyebrow    Length (cm):  2  Exploration:     Wound extent: no foreign bodies/material noted    Treatment:     Area cleansed with:  Saline    Amount of cleaning:  Extensive    Irrigation solution:  Sterile saline    Irrigation volume:  250  Post-procedure details:     Procedure completion:  Procedure terminated electively by provider  Comments:      Patient unable to remain still for lidocaine administration. Due to proximity to eye. Risk of injury outweighs any possible cosmetic improvement with repair. Attempt at repair aborted.     Labs Reviewed - No data to display       Imaging Results              CT Head Without Contrast (In process)                   X-Rays:   Other Radiology Reports: CTH: no acute finding     Medications   LIDOcaine (PF) 10 mg/ml (1%) injection 50 mg (has no administration in time range)   acetaminophen tablet 650 mg (has no administration in time range)   mupirocin 2 % ointment 22 g (has no administration in time range)   Tdap (BOOSTRIX) vaccine injection 0.5 mL (0.5 mLs  Intramuscular Given 8/12/23 2248)     Medical Decision Making:   Differential Diagnosis:   Laceratin, concussion, bleed  Clinical Tests:   Radiological Study: Ordered and Reviewed  ED Management:  CTH without acute finding. Wound cleaned. Patient unable to cooperate with repair. Stable for outpatient follow up.                           Clinical Impression:   Final diagnoses:  [S09.90XA] Injury of head, initial encounter (Primary)  [S01.81XA] Laceration of brow without complication, initial encounter        ED Disposition Condition    Discharge Stable          ED Prescriptions    None       Follow-up Information    None          Kirby Conteh MD  08/13/23 0030

## 2024-01-23 ENCOUNTER — HOSPITAL ENCOUNTER (EMERGENCY)
Facility: HOSPITAL | Age: 54
Discharge: HOME | End: 2024-01-23
Attending: STUDENT IN AN ORGANIZED HEALTH CARE EDUCATION/TRAINING PROGRAM
Payer: MEDICAID

## 2024-01-23 VITALS
HEIGHT: 71 IN | RESPIRATION RATE: 18 BRPM | DIASTOLIC BLOOD PRESSURE: 94 MMHG | SYSTOLIC BLOOD PRESSURE: 134 MMHG | OXYGEN SATURATION: 99 % | BODY MASS INDEX: 20.72 KG/M2 | TEMPERATURE: 98 F | HEART RATE: 98 BPM | WEIGHT: 148 LBS

## 2024-01-23 DIAGNOSIS — T14.90XA INJURY: ICD-10-CM

## 2024-01-23 DIAGNOSIS — M25.519 SHOULDER PAIN, UNSPECIFIED CHRONICITY, UNSPECIFIED LATERALITY: Primary | ICD-10-CM

## 2024-01-23 DIAGNOSIS — M54.12 CERVICAL RADICULOPATHY: ICD-10-CM

## 2024-01-23 PROCEDURE — 99284 EMERGENCY DEPT VISIT MOD MDM: CPT

## 2024-01-23 PROCEDURE — 96372 THER/PROPH/DIAG INJ SC/IM: CPT | Performed by: STUDENT IN AN ORGANIZED HEALTH CARE EDUCATION/TRAINING PROGRAM

## 2024-01-23 PROCEDURE — 63600175 PHARM REV CODE 636 W HCPCS: Performed by: STUDENT IN AN ORGANIZED HEALTH CARE EDUCATION/TRAINING PROGRAM

## 2024-01-23 RX ORDER — KETOROLAC TROMETHAMINE 30 MG/ML
30 INJECTION, SOLUTION INTRAMUSCULAR; INTRAVENOUS
Status: COMPLETED | OUTPATIENT
Start: 2024-01-23 | End: 2024-01-23

## 2024-01-23 RX ORDER — CYCLOBENZAPRINE HCL 5 MG
5 TABLET ORAL 3 TIMES DAILY PRN
Qty: 20 TABLET | Refills: 0 | Status: SHIPPED | OUTPATIENT
Start: 2024-01-23 | End: 2024-02-02

## 2024-01-23 RX ORDER — NAPROXEN 500 MG/1
500 TABLET ORAL 2 TIMES DAILY
Qty: 30 TABLET | Refills: 0 | Status: SHIPPED | OUTPATIENT
Start: 2024-01-23

## 2024-01-23 RX ORDER — ORPHENADRINE CITRATE 30 MG/ML
60 INJECTION INTRAMUSCULAR; INTRAVENOUS
Status: COMPLETED | OUTPATIENT
Start: 2024-01-23 | End: 2024-01-23

## 2024-01-23 RX ADMIN — KETOROLAC TROMETHAMINE 30 MG: 30 INJECTION, SOLUTION INTRAMUSCULAR; INTRAVENOUS at 11:01

## 2024-01-23 RX ADMIN — ORPHENADRINE CITRATE 60 MG: 60 INJECTION INTRAMUSCULAR; INTRAVENOUS at 11:01
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
## 2024-04-26 ENCOUNTER — HOSPITAL ENCOUNTER (OUTPATIENT)
Dept: RADIOLOGY | Facility: HOSPITAL | Age: 54
Discharge: HOME | End: 2024-04-26
Attending: NURSE PRACTITIONER
Payer: MEDICAID

## 2024-04-26 DIAGNOSIS — M54.2 CHRONIC NECK PAIN: ICD-10-CM

## 2024-04-26 DIAGNOSIS — G89.29 CHRONIC NECK PAIN: ICD-10-CM

## 2024-04-26 DIAGNOSIS — G89.29 CHRONIC NECK PAIN: Primary | ICD-10-CM

## 2024-04-26 DIAGNOSIS — M54.2 CHRONIC NECK PAIN: Primary | ICD-10-CM

## 2024-04-26 PROCEDURE — 72040 X-RAY EXAM NECK SPINE 2-3 VW: CPT | Mod: TC

## 2024-04-26 PROCEDURE — 72070 X-RAY EXAM THORAC SPINE 2VWS: CPT | Mod: TC

## 2024-10-21 DIAGNOSIS — G89.29 CHRONIC RIGHT SHOULDER PAIN: Primary | ICD-10-CM

## 2024-10-21 DIAGNOSIS — M25.511 CHRONIC RIGHT SHOULDER PAIN: Primary | ICD-10-CM

## 2024-10-21 DIAGNOSIS — G89.29 CHRONIC PAIN: ICD-10-CM

## 2024-10-25 ENCOUNTER — CLINICAL SUPPORT (OUTPATIENT)
Facility: HOSPITAL | Age: 54
End: 2024-10-25
Payer: MEDICAID

## 2024-10-25 DIAGNOSIS — M25.511 CHRONIC RIGHT SHOULDER PAIN: Primary | ICD-10-CM

## 2024-10-25 DIAGNOSIS — G89.29 CHRONIC RIGHT SHOULDER PAIN: Primary | ICD-10-CM
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
## 2024-11-05 ENCOUNTER — DOCUMENTATION ONLY (OUTPATIENT)
Facility: HOSPITAL | Age: 54
End: 2024-11-05
Payer: MEDICAID

## 2024-11-05 NOTE — PROGRESS NOTES
Pt is a third no show. Phone numbers on file were not accurate. Spoke with daughter, She will contact pt to see if he wants to continue PT.

## 2024-11-19 ENCOUNTER — DOCUMENTATION ONLY (OUTPATIENT)
Facility: HOSPITAL | Age: 54
End: 2024-11-19
Payer: MEDICAID

## 2024-11-19 NOTE — PROGRESS NOTES
PHYSICAL THERAPY DISCHARGE:    This patient was originally evaluated at our facility on: 10/25/2024         Pt attended PT for a total of 0 Visits after evaluation.     This patient's last visit occurred on: 10/25/2024      Short term goals were achieved: none    Long term goals were achieved: none    Pt was DC'd from our care secondary to: non-compliance        Therapist: Zafar Cuevas, PT, DPT  11/19/2024

## 2024-12-18 ENCOUNTER — DOCUMENTATION ONLY (OUTPATIENT)
Facility: HOSPITAL | Age: 54
End: 2024-12-18
Payer: MEDICAID

## 2025-04-15 ENCOUNTER — HOSPITAL ENCOUNTER (OUTPATIENT)
Facility: HOSPITAL | Age: 55
LOS: 1 days | Discharge: HOME | End: 2025-04-17
Attending: EMERGENCY MEDICINE | Admitting: STUDENT IN AN ORGANIZED HEALTH CARE EDUCATION/TRAINING PROGRAM
Payer: COMMERCIAL

## 2025-04-15 ENCOUNTER — ANESTHESIA EVENT (OUTPATIENT)
Dept: SURGERY | Facility: HOSPITAL | Age: 55
End: 2025-04-15
Payer: COMMERCIAL

## 2025-04-15 DIAGNOSIS — G89.18 POST-OPERATIVE PAIN: ICD-10-CM

## 2025-04-15 DIAGNOSIS — K80.00 CALCULUS OF GALLBLADDER WITH ACUTE CHOLECYSTITIS WITHOUT OBSTRUCTION: ICD-10-CM

## 2025-04-15 DIAGNOSIS — K81.9 CHOLECYSTITIS: Primary | ICD-10-CM

## 2025-04-15 DIAGNOSIS — Z01.818 PREOP EXAMINATION: ICD-10-CM

## 2025-04-15 PROBLEM — F17.200 TOBACCO DEPENDENCY: Status: ACTIVE | Noted: 2025-04-15

## 2025-04-15 LAB
ABSOLUTE EOSINOPHIL (OHS): 0.33 K/UL
ABSOLUTE MONOCYTE (OHS): 1.11 K/UL (ref 0.3–1)
ABSOLUTE NEUTROPHIL COUNT (OHS): 14.72 K/UL (ref 1.8–7.7)
ALBUMIN SERPL BCP-MCNC: 4.5 G/DL (ref 3.5–5.2)
ALP SERPL-CCNC: 82 UNIT/L (ref 40–150)
ALT SERPL W/O P-5'-P-CCNC: 11 UNIT/L (ref 10–44)
ANION GAP (OHS): 12 MMOL/L (ref 8–16)
AST SERPL-CCNC: 17 UNIT/L (ref 11–45)
BASOPHILS # BLD AUTO: 0.06 K/UL
BASOPHILS NFR BLD AUTO: 0.3 %
BILIRUB SERPL-MCNC: 0.3 MG/DL (ref 0.1–1)
BUN SERPL-MCNC: 20 MG/DL (ref 6–20)
CALCIUM SERPL-MCNC: 9.7 MG/DL (ref 8.7–10.5)
CHLORIDE SERPL-SCNC: 101 MMOL/L (ref 95–110)
CO2 SERPL-SCNC: 27 MMOL/L (ref 23–29)
CREAT SERPL-MCNC: 1 MG/DL (ref 0.5–1.4)
ERYTHROCYTE [DISTWIDTH] IN BLOOD BY AUTOMATED COUNT: 12.8 % (ref 11.5–14.5)
GFR SERPLBLD CREATININE-BSD FMLA CKD-EPI: >60 ML/MIN/1.73/M2
GLUCOSE SERPL-MCNC: 104 MG/DL (ref 70–110)
HCT VFR BLD AUTO: 47 % (ref 40–54)
HGB BLD-MCNC: 15.2 GM/DL (ref 14–18)
IMM GRANULOCYTES # BLD AUTO: 0.09 K/UL (ref 0–0.04)
IMM GRANULOCYTES NFR BLD AUTO: 0.5 % (ref 0–0.5)
LIPASE SERPL-CCNC: 21 U/L (ref 4–60)
LYMPHOCYTES # BLD AUTO: 1.89 K/UL (ref 1–4.8)
MCH RBC QN AUTO: 28.9 PG (ref 27–31)
MCHC RBC AUTO-ENTMCNC: 32.3 G/DL (ref 32–36)
MCV RBC AUTO: 89 FL (ref 82–98)
NUCLEATED RBC (/100WBC) (OHS): 0 /100 WBC
OHS QRS DURATION: 72 MS
OHS QTC CALCULATION: 386 MS
PLATELET # BLD AUTO: 276 K/UL (ref 150–450)
PMV BLD AUTO: 8.6 FL (ref 9.2–12.9)
POTASSIUM SERPL-SCNC: 4 MMOL/L (ref 3.5–5.1)
PROT SERPL-MCNC: 7.2 GM/DL (ref 6–8.4)
RBC # BLD AUTO: 5.26 M/UL (ref 4.6–6.2)
RELATIVE EOSINOPHIL (OHS): 1.8 %
RELATIVE LYMPHOCYTE (OHS): 10.4 % (ref 18–48)
RELATIVE MONOCYTE (OHS): 6.1 % (ref 4–15)
RELATIVE NEUTROPHIL (OHS): 80.9 % (ref 38–73)
SODIUM SERPL-SCNC: 140 MMOL/L (ref 136–145)
WBC # BLD AUTO: 18.2 K/UL (ref 3.9–12.7)

## 2025-04-15 PROCEDURE — 93005 ELECTROCARDIOGRAM TRACING: CPT

## 2025-04-15 PROCEDURE — 63600175 PHARM REV CODE 636 W HCPCS: Performed by: EMERGENCY MEDICINE

## 2025-04-15 PROCEDURE — 25000003 PHARM REV CODE 250: Performed by: EMERGENCY MEDICINE

## 2025-04-15 PROCEDURE — 96372 THER/PROPH/DIAG INJ SC/IM: CPT | Mod: 59 | Performed by: EMERGENCY MEDICINE

## 2025-04-15 PROCEDURE — 85025 COMPLETE CBC W/AUTO DIFF WBC: CPT | Performed by: EMERGENCY MEDICINE

## 2025-04-15 PROCEDURE — 96376 TX/PRO/DX INJ SAME DRUG ADON: CPT

## 2025-04-15 PROCEDURE — 99406 BEHAV CHNG SMOKING 3-10 MIN: CPT | Mod: ,,,

## 2025-04-15 PROCEDURE — 80053 COMPREHEN METABOLIC PANEL: CPT | Performed by: EMERGENCY MEDICINE

## 2025-04-15 PROCEDURE — 96375 TX/PRO/DX INJ NEW DRUG ADDON: CPT

## 2025-04-15 PROCEDURE — G0378 HOSPITAL OBSERVATION PER HR: HCPCS

## 2025-04-15 PROCEDURE — 83690 ASSAY OF LIPASE: CPT | Performed by: EMERGENCY MEDICINE

## 2025-04-15 PROCEDURE — S4991 NICOTINE PATCH NONLEGEND: HCPCS | Performed by: STUDENT IN AN ORGANIZED HEALTH CARE EDUCATION/TRAINING PROGRAM

## 2025-04-15 PROCEDURE — 96366 THER/PROPH/DIAG IV INF ADDON: CPT

## 2025-04-15 PROCEDURE — 63600175 PHARM REV CODE 636 W HCPCS: Performed by: STUDENT IN AN ORGANIZED HEALTH CARE EDUCATION/TRAINING PROGRAM

## 2025-04-15 PROCEDURE — 25000003 PHARM REV CODE 250: Performed by: STUDENT IN AN ORGANIZED HEALTH CARE EDUCATION/TRAINING PROGRAM

## 2025-04-15 PROCEDURE — 99223 1ST HOSP IP/OBS HIGH 75: CPT | Mod: 25,57,,

## 2025-04-15 PROCEDURE — 93010 ELECTROCARDIOGRAM REPORT: CPT | Mod: ,,, | Performed by: STUDENT IN AN ORGANIZED HEALTH CARE EDUCATION/TRAINING PROGRAM

## 2025-04-15 PROCEDURE — 25500020 PHARM REV CODE 255: Performed by: EMERGENCY MEDICINE

## 2025-04-15 PROCEDURE — 99285 EMERGENCY DEPT VISIT HI MDM: CPT | Mod: 25

## 2025-04-15 PROCEDURE — 96365 THER/PROPH/DIAG IV INF INIT: CPT

## 2025-04-15 PROCEDURE — 36415 COLL VENOUS BLD VENIPUNCTURE: CPT | Performed by: EMERGENCY MEDICINE

## 2025-04-15 RX ORDER — HYDROMORPHONE HYDROCHLORIDE 1 MG/ML
0.5 INJECTION, SOLUTION INTRAMUSCULAR; INTRAVENOUS; SUBCUTANEOUS EVERY 4 HOURS PRN
Status: DISCONTINUED | OUTPATIENT
Start: 2025-04-15 | End: 2025-04-16

## 2025-04-15 RX ORDER — FAMOTIDINE 10 MG/ML
20 INJECTION, SOLUTION INTRAVENOUS EVERY 12 HOURS
Status: DISCONTINUED | OUTPATIENT
Start: 2025-04-15 | End: 2025-04-17 | Stop reason: HOSPADM

## 2025-04-15 RX ORDER — SODIUM CHLORIDE 9 MG/ML
INJECTION, SOLUTION INTRAVENOUS CONTINUOUS
Status: DISCONTINUED | OUTPATIENT
Start: 2025-04-15 | End: 2025-04-17 | Stop reason: HOSPADM

## 2025-04-15 RX ORDER — HYDROMORPHONE HYDROCHLORIDE 1 MG/ML
1 INJECTION, SOLUTION INTRAMUSCULAR; INTRAVENOUS; SUBCUTANEOUS EVERY 4 HOURS PRN
Status: DISCONTINUED | OUTPATIENT
Start: 2025-04-15 | End: 2025-04-16

## 2025-04-15 RX ORDER — FAMOTIDINE 10 MG/ML
40 INJECTION, SOLUTION INTRAVENOUS
Status: COMPLETED | OUTPATIENT
Start: 2025-04-15 | End: 2025-04-15

## 2025-04-15 RX ORDER — DICYCLOMINE HYDROCHLORIDE 10 MG/ML
20 INJECTION INTRAMUSCULAR
Status: COMPLETED | OUTPATIENT
Start: 2025-04-15 | End: 2025-04-15

## 2025-04-15 RX ORDER — ONDANSETRON HYDROCHLORIDE 2 MG/ML
8 INJECTION, SOLUTION INTRAVENOUS EVERY 6 HOURS PRN
Status: DISCONTINUED | OUTPATIENT
Start: 2025-04-15 | End: 2025-04-17 | Stop reason: HOSPADM

## 2025-04-15 RX ORDER — MORPHINE SULFATE 4 MG/ML
4 INJECTION, SOLUTION INTRAMUSCULAR; INTRAVENOUS
Refills: 0 | Status: COMPLETED | OUTPATIENT
Start: 2025-04-15 | End: 2025-04-15

## 2025-04-15 RX ORDER — IBUPROFEN 200 MG
1 TABLET ORAL DAILY
Status: DISCONTINUED | OUTPATIENT
Start: 2025-04-15 | End: 2025-04-17 | Stop reason: HOSPADM

## 2025-04-15 RX ORDER — ONDANSETRON HYDROCHLORIDE 2 MG/ML
4 INJECTION, SOLUTION INTRAVENOUS
Status: COMPLETED | OUTPATIENT
Start: 2025-04-15 | End: 2025-04-15

## 2025-04-15 RX ORDER — SODIUM CHLORIDE 0.9 % (FLUSH) 0.9 %
10 SYRINGE (ML) INJECTION
Status: DISCONTINUED | OUTPATIENT
Start: 2025-04-15 | End: 2025-04-17 | Stop reason: HOSPADM

## 2025-04-15 RX ORDER — LIDOCAINE HYDROCHLORIDE 20 MG/ML
15 SOLUTION OROPHARYNGEAL ONCE
Status: COMPLETED | OUTPATIENT
Start: 2025-04-15 | End: 2025-04-15

## 2025-04-15 RX ORDER — ALUMINUM HYDROXIDE, MAGNESIUM HYDROXIDE, AND SIMETHICONE 1200; 120; 1200 MG/30ML; MG/30ML; MG/30ML
30 SUSPENSION ORAL ONCE
Status: COMPLETED | OUTPATIENT
Start: 2025-04-15 | End: 2025-04-15

## 2025-04-15 RX ADMIN — ALUMINUM HYDROXIDE, MAGNESIUM HYDROXIDE, AND DIMETHICONE 30 ML: 200; 20; 200 SUSPENSION ORAL at 04:04

## 2025-04-15 RX ADMIN — IOHEXOL 100 ML: 350 INJECTION, SOLUTION INTRAVENOUS at 05:04

## 2025-04-15 RX ADMIN — SODIUM CHLORIDE: 9 INJECTION, SOLUTION INTRAVENOUS at 12:04

## 2025-04-15 RX ADMIN — NICOTINE 1 PATCH: 14 PATCH, EXTENDED RELEASE TRANSDERMAL at 12:04

## 2025-04-15 RX ADMIN — FAMOTIDINE 20 MG: 10 INJECTION, SOLUTION INTRAVENOUS at 08:04

## 2025-04-15 RX ADMIN — DICYCLOMINE HYDROCHLORIDE 20 MG: 10 INJECTION INTRAMUSCULAR at 04:04

## 2025-04-15 RX ADMIN — PIPERACILLIN AND TAZOBACTAM 4.5 G: 4; .5 INJECTION, POWDER, LYOPHILIZED, FOR SOLUTION INTRAVENOUS; PARENTERAL at 11:04

## 2025-04-15 RX ADMIN — MORPHINE SULFATE 4 MG: 4 INJECTION INTRAVENOUS at 05:04

## 2025-04-15 RX ADMIN — LIDOCAINE HYDROCHLORIDE 15 ML: 20 SOLUTION ORAL at 04:04

## 2025-04-15 RX ADMIN — PIPERACILLIN AND TAZOBACTAM 4.5 G: 4; .5 INJECTION, POWDER, LYOPHILIZED, FOR SOLUTION INTRAVENOUS; PARENTERAL at 08:04

## 2025-04-15 RX ADMIN — FAMOTIDINE 40 MG: 10 INJECTION, SOLUTION INTRAVENOUS at 04:04

## 2025-04-15 RX ADMIN — ONDANSETRON 4 MG: 2 INJECTION INTRAMUSCULAR; INTRAVENOUS at 04:04

## 2025-04-15 NOTE — ED PROVIDER NOTES
"EMERGENCY DEPARTMENT HISTORY AND PHYSICAL EXAM     This note is dictated on M*Modal word recognition program.  There are word recognition mistakes and grammatical errors that are occasionally missed on review.     Date: 4/15/2025   Patient Name: Leyla Rayo       History of Presenting Illness      Chief Complaint   Patient presents with    Abdominal Pain     Generalized abdominal burning x 3.5 hrs.           0350   Leyla Rayo is a 54 y.o. male with PMHX of SI, cocaine use disorder who presents to the emergency department C/O abdominal pain.    Patient reports abdominal pain.  Symptoms started about 3-1/2 hours ago after eating a hot dog.  Patient localizes pain over central abdomen going up into chest.  Described as burning sensation.  Associated nausea without vomiting.  Denies pain that radiates to his shoulders or back.  Denies prior episodes.  No history of abdominal surgery.  Patient states pain is severe and he feels like he is going to pass out.      PCP: No, Primary Doctor      Current Medications[1]        Past History     Past Medical History:   Past Medical History:   Diagnosis Date    History of psychiatric hospitalization     Insomnia     Insomnia     Psychiatric problem         Past Surgical History:   History reviewed. No pertinent surgical history.     Family History:   No family history on file.     Social History:   Social History[2]     Allergies:   Review of patient's allergies indicates:  No Known Allergies       Review of Systems   Review of Systems   See HPI for pertinent positives and negatives       Physical Exam     Vitals:    04/15/25 0338 04/15/25 0535 04/15/25 0630 04/15/25 0800   BP: 101/74  132/72 100/65   BP Location: Right arm  Left arm    Patient Position:   Lying    Pulse: 67  82 84   Resp: 18 16 18 18   Temp: 98.7 °F (37.1 °C)  98.4 °F (36.9 °C) 98.3 °F (36.8 °C)   TempSrc: Oral  Oral    SpO2: 97%  99% 98%   Weight: 74.8 kg (164 lb 14.5 oz)      Height: 5' 8" (1.727 m)       "   Physical Exam  Vitals and nursing note reviewed.   Constitutional:       General: He is not in acute distress.     Appearance: Normal appearance. He is not ill-appearing.      Comments: Disheveled, appears in pain   HENT:      Head: Normocephalic and atraumatic.   Eyes:      Extraocular Movements: Extraocular movements intact.      Conjunctiva/sclera: Conjunctivae normal.   Pulmonary:      Effort: Pulmonary effort is normal. No respiratory distress.   Abdominal:      General: Abdomen is flat.      Palpations: Abdomen is soft.      Tenderness: There is generalized abdominal tenderness. There is no guarding.   Musculoskeletal:         General: No deformity or signs of injury. Normal range of motion.      Cervical back: Normal range of motion. No rigidity.   Skin:     General: Skin is dry.      Coloration: Skin is not pale.      Findings: No rash.   Neurological:      General: No focal deficit present.      Mental Status: He is alert and oriented to person, place, and time.      Cranial Nerves: No cranial nerve deficit.      Motor: No weakness.      Coordination: Coordination normal.   Psychiatric:         Mood and Affect: Mood is anxious.              Diagnostic Study Results      Labs -   Recent Results (from the past 12 hours)   Comprehensive Metabolic Panel    Collection Time: 04/15/25  4:09 AM   Result Value Ref Range    Sodium 140 136 - 145 mmol/L    Potassium 4.0 3.5 - 5.1 mmol/L    Chloride 101 95 - 110 mmol/L    CO2 27 23 - 29 mmol/L    Glucose 104 70 - 110 mg/dL    BUN 20 6 - 20 mg/dL    Creatinine 1.0 0.5 - 1.4 mg/dL    Calcium 9.7 8.7 - 10.5 mg/dL    Protein Total 7.2 6.0 - 8.4 gm/dL    Albumin 4.5 3.5 - 5.2 g/dL    Bilirubin Total 0.3 0.1 - 1.0 mg/dL    ALP 82 40 - 150 unit/L    AST 17 11 - 45 unit/L    ALT 11 10 - 44 unit/L    Anion Gap 12 8 - 16 mmol/L    eGFR >60 >60 mL/min/1.73/m2   Lipase    Collection Time: 04/15/25  4:09 AM   Result Value Ref Range    Lipase Level 21 4 - 60 U/L   CBC with  Differential    Collection Time: 04/15/25  4:09 AM   Result Value Ref Range    WBC 18.20 (H) 3.90 - 12.70 K/uL    RBC 5.26 4.60 - 6.20 M/uL    HGB 15.2 14.0 - 18.0 gm/dL    HCT 47.0 40.0 - 54.0 %    MCV 89 82 - 98 fL    MCH 28.9 27.0 - 31.0 pg    MCHC 32.3 32.0 - 36.0 g/dL    RDW 12.8 11.5 - 14.5 %    Platelet Count 276 150 - 450 K/uL    MPV 8.6 (L) 9.2 - 12.9 fL    Nucleated RBC 0 <=0 /100 WBC    Neut % 80.9 (H) 38 - 73 %    Lymph % 10.4 (L) 18 - 48 %    Mono % 6.1 4 - 15 %    Eos % 1.8 <=8 %    Basophil % 0.3 <=1.9 %    Imm Grans % 0.5 0.0 - 0.5 %    Neut # 14.72 (H) 1.8 - 7.7 K/uL    Lymph # 1.89 1 - 4.8 K/uL    Mono # 1.11 (H) 0.3 - 1 K/uL    Eos # 0.33 <=0.5 K/uL    Baso # 0.06 <=0.2 K/uL    Imm Grans # 0.09 (H) 0.00 - 0.04 K/uL        Radiologic Studies -    US Abdomen Limited_Gallbladder   Final Result      Cholelithiasis with gallbladder wall thickening, suspicious for cholecystitis.  Suspected stone at the gallbladder neck.  Sonographer reports a negative sonographic Arevalo sign.         Electronically signed by: Deniz Cisneros MD   Date:    04/15/2025   Time:    10:23      CT Abdomen Pelvis With IV Contrast NO Oral Contrast   Final Result      Evidence of cholecystitis.      A preliminary report was faxed by [facility] shortly after completion of this examination.         Electronically signed by: Deniz Cisneros MD   Date:    04/15/2025   Time:    10:20           Medications given in the ED-   Medications   piperacillin-tazobactam (ZOSYN) 4.5 g in D5W 100 mL IVPB (MB+) (has no administration in time range)   dicyclomine injection 20 mg (20 mg Intramuscular Given 4/15/25 0415)   aluminum-magnesium hydroxide-simethicone 200-200-20 mg/5 mL suspension 30 mL (30 mLs Oral Given 4/15/25 0401)     And   LIDOcaine viscous HCl 2% oral solution 15 mL (15 mLs Oral Given 4/15/25 0401)   famotidine (PF) injection 40 mg (40 mg Intravenous Given 4/15/25 0409)   ondansetron injection 4 mg (4 mg Intravenous Given  4/15/25 0409)   morphine injection 4 mg (4 mg Intravenous Given 4/15/25 0535)   iohexoL (OMNIPAQUE 350) injection 100 mL (100 mLs Intravenous Given 4/15/25 0571)           Medical Decision Making    I am the first provider for this patient.     I reviewed the vital signs, available nursing notes, past medical history, past surgical history, family history and social history.     Vital Signs:  Reviewed the patient's vital signs.     Pulse Oximetry Analysis and Interpretation:    97% on Room Air, normal    External Test Results (Pertinent to encounter):    Records Reviewed: Nursing Notes and Current Prescription Medications    History Obtained By: Patient    Provider Notes: Leyla Rayo is a 54 y.o. male with abdominal pain    Co-morbidities Considered:  Psychiatric history    Differential Diagnosis:  Gastritis, GERD, PUD, biliary colic, pancreatitis, bowel obstruction, vascular catastrophe      ED Course:      Signout     5:58 AM     Patient's presentation, labs/imaging and plan of care was reviewed with Dr Maldonado as part of sign out.  They will follow up on imaging as part of the plan discussed with the patient.              Problems Addressed:      Procedures:   Procedures       Diagnosis and Disposition     Critical Care:      DISCHARGE NOTE:       Leyla Rayo's  results have been reviewed with him.  He has been counseled regarding his diagnosis, treatment, and plan.  He verbally conveys understanding and agreement of the signs, symptoms, diagnosis, treatment and prognosis and additionally agrees to follow up as discussed.  He also agrees with the care-plan and conveys that all of his questions have been answered.  I have also provided discharge instructions for him that include: educational information regarding their diagnosis and treatment, and list of reasons why they would want to return to the ED prior to their follow-up appointment, should his condition change. He has been provided with education for  proper emergency department utilization.         CLINICAL IMPRESSION:         1. Cholecystitis              PLAN:   1. Admit to Hospital  2.      Medication List        ASK your doctor about these medications      gabapentin 300 MG capsule  Commonly known as: NEURONTIN  Take 2 capsules (600 mg total) by mouth 3 (three) times daily.     hydrOXYzine 100 MG capsule  Commonly known as: VISTARIL  Take 1 capsule (100 mg total) by mouth nightly as needed (insomnia).     mirtazapine 30 MG tablet  Commonly known as: REMERON  Take 1 tablet (30 mg total) by mouth every evening.     naproxen 500 MG tablet  Commonly known as: NAPROSYN  Take 1 tablet (500 mg total) by mouth 2 (two) times daily.     venlafaxine 75 MG 24 hr capsule  Commonly known as: EFFEXOR-XR  Take 1 capsule (75 mg total) by mouth once daily.             3. No follow-up provider specified.     _______________________________     Please note that this dictation was completed with [employer], the computer voice recognition software.  Quite often unanticipated grammatical, syntax, homophones, and other interpretive errors are inadvertently transcribed by the computer software.  Please disregard these errors.  Please excuse any errors that have escaped final proofreading.               [1]   Current Facility-Administered Medications   Medication Dose Route Frequency Provider Last Rate Last Admin    piperacillin-tazobactam (ZOSYN) 4.5 g in D5W 100 mL IVPB (MB+)  4.5 g Intravenous ED 1 Time German Gomez MD         Current Outpatient Medications   Medication Sig Dispense Refill    gabapentin (NEURONTIN) 300 MG capsule Take 2 capsules (600 mg total) by mouth 3 (three) times daily. (Patient not taking: Reported on 4/15/2025) 180 capsule 2    hydrOXYzine pamoate (VISTARIL) 100 MG capsule Take 1 capsule (100 mg total) by mouth nightly as needed (insomnia). (Patient not taking: Reported on 4/15/2025) 30 capsule 2    mirtazapine (REMERON) 30 MG tablet Take 1 tablet (30 mg  total) by mouth every evening. (Patient not taking: Reported on 4/15/2025) 30 tablet 2    naproxen (NAPROSYN) 500 MG tablet Take 1 tablet (500 mg total) by mouth 2 (two) times daily. (Patient not taking: Reported on 4/15/2025) 30 tablet 0    venlafaxine (EFFEXOR-XR) 75 MG 24 hr capsule Take 1 capsule (75 mg total) by mouth once daily. (Patient not taking: Reported on 4/15/2025) 30 capsule 2   [2]   Social History  Tobacco Use    Smoking status: Every Day     Current packs/day: 0.50     Average packs/day: 0.5 packs/day for 25.0 years (12.5 ttl pk-yrs)     Types: Cigarettes    Smokeless tobacco: Never   Substance Use Topics    Alcohol use: Not Currently     Comment: Pt reports he is not drinking currently but does drink once every two weeks    Drug use: Not Currently     Comment: Pt reports he is not currently using drugs, but he does have a history of cocaine and meth use        German Gomez MD  04/15/25 1615

## 2025-04-15 NOTE — ED NOTES
NEUROLOGICAL:   Patient is awake , alert , and oriented x 4 .   Moves all extremities without difficulty.   Patient reports no neuro complaints..  GCS 15    CARDIOVASCULAR:   S1 and S2 present, no murmurs, gallops, or rubs, rate regular , and pulses palpable (2+)    On palpation no edema noted , noted to none.   Patient reports no CV complaints.  .     RESPIRATORY:   Airway Clear, Open, and Patent.  Respirations are even and unlabored.   Breath sounds clear  to all lung fields.   Patient reports no respiratory complaints.     SKIN:   Skin appears warm , dry , good turgor, color normal for race, and intact.

## 2025-04-15 NOTE — ASSESSMENT & PLAN NOTE
-Confirmed on US; no biliary dilatation   -To OR tomorrow 4/16 for lap vs open cholecystectomy   -CLD  -NPO after midnight  -IV Zosyn  -WBCs 18  -Multimodal pain regimen  -Antiemetics  -Preop EKG and chest x-ray

## 2025-04-15 NOTE — H&P
[facility]  General Surgery  History & Physical    Patient Name: Leyla Rayo  MRN: [id number]  Admission Date: 4/15/2025  Attending Physician: Morena Bang MD   Primary Care Provider: Estefanía Primary Doctor    Patient information was obtained from patient and ER records.     Subjective:     Chief Complaint:   Chief Complaint   Patient presents with    Abdominal Pain     Generalized abdominal burning x 3.5 hrs.         History of Present Illness:  HPI: 53yo M with a PMHx of psychiatric disorder admitted to general surgery for cholelithiasis with acute cholecystitis. Patient reports abdominal pain onset yesterday after eating a hot dog described as burning, progressively worsened. Imaging confirmed cholecystitis. Leukocytosis noted, afebrile.     No current facility-administered medications on file prior to encounter.     Current Outpatient Medications on File Prior to Encounter   Medication Sig    gabapentin (NEURONTIN) 300 MG capsule Take 2 capsules (600 mg total) by mouth 3 (three) times daily. (Patient not taking: Reported on 4/15/2025)    hydrOXYzine pamoate (VISTARIL) 100 MG capsule Take 1 capsule (100 mg total) by mouth nightly as needed (insomnia). (Patient not taking: Reported on 4/15/2025)    mirtazapine (REMERON) 30 MG tablet Take 1 tablet (30 mg total) by mouth every evening. (Patient not taking: Reported on 4/15/2025)    naproxen (NAPROSYN) 500 MG tablet Take 1 tablet (500 mg total) by mouth 2 (two) times daily. (Patient not taking: Reported on 4/15/2025)    venlafaxine (EFFEXOR-XR) 75 MG 24 hr capsule Take 1 capsule (75 mg total) by mouth once daily. (Patient not taking: Reported on 4/15/2025)    [DISCONTINUED] clonazePAM (KLONOPIN) 0.5 MG tablet Take 1 tablet (0.5 mg total) by mouth 3 (three) times daily as needed for Anxiety.    [DISCONTINUED] trazodone (DESYREL) 50 MG tablet Take 1 tablet (50 mg total) by mouth every evening.       Review of patient's allergies indicates:  No Known  Allergies    Past Medical History:   Diagnosis Date    History of psychiatric hospitalization     Insomnia     Insomnia     Psychiatric problem      History reviewed. No pertinent surgical history.  Family History    None       Tobacco Use    Smoking status: Every Day     Current packs/day: 0.50     Average packs/day: 0.5 packs/day for 25.0 years (12.5 ttl pk-yrs)     Types: Cigarettes    Smokeless tobacco: Never   Substance and Sexual Activity    Alcohol use: Not Currently     Comment: Pt reports he is not drinking currently but does drink once every two weeks    Drug use: Not Currently     Comment: Pt reports he is not currently using drugs, but he does have a history of cocaine and meth use    Sexual activity: Yes     Partners: Female     Birth control/protection: None     Review of Systems   Constitutional:  Negative for activity change, appetite change, chills, diaphoresis, fatigue, fever and unexpected weight change.   Respiratory:  Negative for cough, shortness of breath and wheezing.    Cardiovascular:  Negative for chest pain, palpitations and leg swelling.   Gastrointestinal:  Positive for abdominal pain. Negative for abdominal distention, anal bleeding, blood in stool, constipation, diarrhea, nausea, rectal pain and vomiting.   Neurological:  Negative for dizziness, tremors, seizures, syncope, weakness, light-headedness, numbness and headaches.   Hematological:  Negative for adenopathy. Does not bruise/bleed easily.   All other systems reviewed and are negative.    Objective:     Vital Signs (Most Recent):  Temp: 98.2 °F (36.8 °C) (04/15/25 1145)  Pulse: 63 (04/15/25 1145)  Resp: 18 (04/15/25 1145)  BP: 97/65 (04/15/25 1145)  SpO2: 98 % (04/15/25 1145) Vital Signs (24h Range):  Temp:  [98.2 °F (36.8 °C)-98.7 °F (37.1 °C)] 98.2 °F (36.8 °C)  Pulse:  [63-86] 63  Resp:  [16-18] 18  SpO2:  [97 %-99 %] 98 %  BP: ()/(65-74) 97/65     Weight: 74.8 kg (164 lb 14.5 oz)  Body mass index is 25.07 kg/m².      Physical Exam  Vitals and nursing note reviewed.   Constitutional:       General: He is not in acute distress.     Appearance: Normal appearance. He is not ill-appearing, toxic-appearing or diaphoretic.   HENT:      Head: Normocephalic and atraumatic.      Mouth/Throat:      Mouth: Mucous membranes are moist.   Eyes:      Conjunctiva/sclera: Conjunctivae normal.   Cardiovascular:      Rate and Rhythm: Normal rate and regular rhythm.   Pulmonary:      Effort: Pulmonary effort is normal. No respiratory distress.   Abdominal:      General: There is no distension.      Palpations: Abdomen is soft.      Tenderness: There is abdominal tenderness in the right upper quadrant and epigastric area. There is no guarding or rebound.   Musculoskeletal:         General: Normal range of motion.      Cervical back: Normal range of motion.   Skin:     General: Skin is warm.      Capillary Refill: Capillary refill takes less than 2 seconds.   Neurological:      Mental Status: He is alert and oriented to person, place, and time. Mental status is at baseline.      Motor: No weakness.      Gait: Gait normal.   Psychiatric:         Mood and Affect: Mood normal.         Behavior: Behavior normal.         Thought Content: Thought content normal.         Judgment: Judgment normal.            I have reviewed all pertinent lab results within the past 24 hours.  CBC:   Recent Labs   Lab 04/15/25  0409   WBC 18.20*   RBC 5.26   HGB 15.2   HCT 47.0      MCV 89   MCH 28.9   MCHC 32.3     CMP:   Recent Labs   Lab 04/15/25  0409   CALCIUM 9.7   ALBUMIN 4.5      K 4.0   CO2 27      BUN 20   CREATININE 1.0   ALKPHOS 82   ALT 11   AST 17   BILITOT 0.3       Significant Diagnostics:  I have reviewed all pertinent imaging results/findings within the past 24 hours.    Assessment/Plan:     * Calculus of gallbladder with acute cholecystitis without obstruction  -Confirmed on US; no biliary dilatation   -To OR tomorrow 4/16 for lap vs  open cholecystectomy   -CLD  -NPO after midnight  -IV Zosyn  -WBCs 18  -Multimodal pain regimen  -Antiemetics  -Preop EKG and chest x-ray    Tobacco dependency  Dangers of cigarette smoking were reviewed with patient in detail. Patient was Counseled for 3-10 minutes. Nicotine replacement options were discussed. Nicotine replacement was discussed- prescribed      VTE Risk Mitigation (From admission, onward)           Ordered     IP VTE LOW RISK PATIENT  Once         04/15/25 1118     Place TAMY hose  Until discontinued         04/15/25 1118     Place sequential compression device  Until discontinued         04/15/25 1118                    MEMO PERRY NP  General Surgery  [facility]

## 2025-04-15 NOTE — SUBJECTIVE & OBJECTIVE
No current facility-administered medications on file prior to encounter.     Current Outpatient Medications on File Prior to Encounter   Medication Sig    gabapentin (NEURONTIN) 300 MG capsule Take 2 capsules (600 mg total) by mouth 3 (three) times daily. (Patient not taking: Reported on 4/15/2025)    hydrOXYzine pamoate (VISTARIL) 100 MG capsule Take 1 capsule (100 mg total) by mouth nightly as needed (insomnia). (Patient not taking: Reported on 4/15/2025)    mirtazapine (REMERON) 30 MG tablet Take 1 tablet (30 mg total) by mouth every evening. (Patient not taking: Reported on 4/15/2025)    naproxen (NAPROSYN) 500 MG tablet Take 1 tablet (500 mg total) by mouth 2 (two) times daily. (Patient not taking: Reported on 4/15/2025)    venlafaxine (EFFEXOR-XR) 75 MG 24 hr capsule Take 1 capsule (75 mg total) by mouth once daily. (Patient not taking: Reported on 4/15/2025)    [DISCONTINUED] clonazePAM (KLONOPIN) 0.5 MG tablet Take 1 tablet (0.5 mg total) by mouth 3 (three) times daily as needed for Anxiety.    [DISCONTINUED] trazodone (DESYREL) 50 MG tablet Take 1 tablet (50 mg total) by mouth every evening.       Review of patient's allergies indicates:  No Known Allergies    Past Medical History:   Diagnosis Date    History of psychiatric hospitalization     Insomnia     Insomnia     Psychiatric problem      History reviewed. No pertinent surgical history.  Family History    None       Tobacco Use    Smoking status: Every Day     Current packs/day: 0.50     Average packs/day: 0.5 packs/day for 25.0 years (12.5 ttl pk-yrs)     Types: Cigarettes    Smokeless tobacco: Never   Substance and Sexual Activity    Alcohol use: Not Currently     Comment: Pt reports he is not drinking currently but does drink once every two weeks    Drug use: Not Currently     Comment: Pt reports he is not currently using drugs, but he does have a history of cocaine and meth use    Sexual activity: Yes     Partners: Female     Birth  control/protection: None     Review of Systems   Constitutional:  Negative for activity change, appetite change, chills, diaphoresis, fatigue, fever and unexpected weight change.   Respiratory:  Negative for cough, shortness of breath and wheezing.    Cardiovascular:  Negative for chest pain, palpitations and leg swelling.   Gastrointestinal:  Positive for abdominal pain. Negative for abdominal distention, anal bleeding, blood in stool, constipation, diarrhea, nausea, rectal pain and vomiting.   Neurological:  Negative for dizziness, tremors, seizures, syncope, weakness, light-headedness, numbness and headaches.   Hematological:  Negative for adenopathy. Does not bruise/bleed easily.   All other systems reviewed and are negative.    Objective:     Vital Signs (Most Recent):  Temp: 98.2 °F (36.8 °C) (04/15/25 1145)  Pulse: 63 (04/15/25 1145)  Resp: 18 (04/15/25 1145)  BP: 97/65 (04/15/25 1145)  SpO2: 98 % (04/15/25 1145) Vital Signs (24h Range):  Temp:  [98.2 °F (36.8 °C)-98.7 °F (37.1 °C)] 98.2 °F (36.8 °C)  Pulse:  [63-86] 63  Resp:  [16-18] 18  SpO2:  [97 %-99 %] 98 %  BP: ()/(65-74) 97/65     Weight: 74.8 kg (164 lb 14.5 oz)  Body mass index is 25.07 kg/m².     Physical Exam  Vitals and nursing note reviewed.   Constitutional:       General: He is not in acute distress.     Appearance: Normal appearance. He is not ill-appearing, toxic-appearing or diaphoretic.   HENT:      Head: Normocephalic and atraumatic.      Mouth/Throat:      Mouth: Mucous membranes are moist.   Eyes:      Conjunctiva/sclera: Conjunctivae normal.   Cardiovascular:      Rate and Rhythm: Normal rate and regular rhythm.   Pulmonary:      Effort: Pulmonary effort is normal. No respiratory distress.   Abdominal:      General: There is no distension.      Palpations: Abdomen is soft.      Tenderness: There is abdominal tenderness in the right upper quadrant and epigastric area. There is no guarding or rebound.   Musculoskeletal:          General: Normal range of motion.      Cervical back: Normal range of motion.   Skin:     General: Skin is warm.      Capillary Refill: Capillary refill takes less than 2 seconds.   Neurological:      Mental Status: He is alert and oriented to person, place, and time. Mental status is at baseline.      Motor: No weakness.      Gait: Gait normal.   Psychiatric:         Mood and Affect: Mood normal.         Behavior: Behavior normal.         Thought Content: Thought content normal.         Judgment: Judgment normal.            I have reviewed all pertinent lab results within the past 24 hours.  CBC:   Recent Labs   Lab 04/15/25  0409   WBC 18.20*   RBC 5.26   HGB 15.2   HCT 47.0      MCV 89   MCH 28.9   MCHC 32.3     CMP:   Recent Labs   Lab 04/15/25  0409   CALCIUM 9.7   ALBUMIN 4.5      K 4.0   CO2 27      BUN 20   CREATININE 1.0   ALKPHOS 82   ALT 11   AST 17   BILITOT 0.3       Significant Diagnostics:  I have reviewed all pertinent imaging results/findings within the past 24 hours.

## 2025-04-15 NOTE — HPI
GS HPI: 53yo M with a PMHx of psychiatric disorder admitted to general surgery for cholelithiasis with acute cholecystitis. Patient reports abdominal pain onset yesterday after eating a hot dog described as burning, progressively worsened. Imaging confirmed cholecystitis. Leukocytosis noted, afebrile.

## 2025-04-15 NOTE — H&P
[facility]  General Surgery  History & Physical    Patient Name: Leyla Rayo  MRN: [id number]  Admission Date: 4/15/2025  Attending Physician: Morena Bang MD   Primary Care Provider: Estefanía Primary Doctor    Patient information was obtained from patient and ER records.     Subjective:     Chief Complaint:   Chief Complaint   Patient presents with    Abdominal Pain     Generalized abdominal burning x 3.5 hrs.         History of Present Illness:  HPI: 53yo M with a PMHx of psychiatric disorder admitted to general surgery for cholelithiasis with acute cholecystitis. Patient reports abdominal pain onset yesterday after eating a hot dog described as burning, progressively worsened. Imaging confirmed cholecystitis. Leukocytosis noted, afebrile.     No current facility-administered medications on file prior to encounter.     Current Outpatient Medications on File Prior to Encounter   Medication Sig    gabapentin (NEURONTIN) 300 MG capsule Take 2 capsules (600 mg total) by mouth 3 (three) times daily. (Patient not taking: Reported on 4/15/2025)    hydrOXYzine pamoate (VISTARIL) 100 MG capsule Take 1 capsule (100 mg total) by mouth nightly as needed (insomnia). (Patient not taking: Reported on 4/15/2025)    mirtazapine (REMERON) 30 MG tablet Take 1 tablet (30 mg total) by mouth every evening. (Patient not taking: Reported on 4/15/2025)    naproxen (NAPROSYN) 500 MG tablet Take 1 tablet (500 mg total) by mouth 2 (two) times daily. (Patient not taking: Reported on 4/15/2025)    venlafaxine (EFFEXOR-XR) 75 MG 24 hr capsule Take 1 capsule (75 mg total) by mouth once daily. (Patient not taking: Reported on 4/15/2025)    [DISCONTINUED] clonazePAM (KLONOPIN) 0.5 MG tablet Take 1 tablet (0.5 mg total) by mouth 3 (three) times daily as needed for Anxiety.    [DISCONTINUED] trazodone (DESYREL) 50 MG tablet Take 1 tablet (50 mg total) by mouth every evening.       Review of patient's allergies indicates:  No Known  Allergies    Past Medical History:   Diagnosis Date    History of psychiatric hospitalization     Insomnia     Insomnia     Psychiatric problem      History reviewed. No pertinent surgical history.  Family History    None       Tobacco Use    Smoking status: Every Day     Current packs/day: 0.50     Average packs/day: 0.5 packs/day for 25.0 years (12.5 ttl pk-yrs)     Types: Cigarettes    Smokeless tobacco: Never   Substance and Sexual Activity    Alcohol use: Not Currently     Comment: Pt reports he is not drinking currently but does drink once every two weeks    Drug use: Not Currently     Comment: Pt reports he is not currently using drugs, but he does have a history of cocaine and meth use    Sexual activity: Yes     Partners: Female     Birth control/protection: None     Review of Systems   Constitutional:  Negative for activity change, appetite change, chills, diaphoresis, fatigue, fever and unexpected weight change.   Respiratory:  Negative for cough, shortness of breath and wheezing.    Cardiovascular:  Negative for chest pain, palpitations and leg swelling.   Gastrointestinal:  Positive for abdominal pain. Negative for abdominal distention, anal bleeding, blood in stool, constipation, diarrhea, nausea, rectal pain and vomiting.   Neurological:  Negative for dizziness, tremors, seizures, syncope, weakness, light-headedness, numbness and headaches.   Hematological:  Negative for adenopathy. Does not bruise/bleed easily.   All other systems reviewed and are negative.    Objective:     Vital Signs (Most Recent):  Temp: 98.2 °F (36.8 °C) (04/15/25 1145)  Pulse: 63 (04/15/25 1145)  Resp: 18 (04/15/25 1145)  BP: 97/65 (04/15/25 1145)  SpO2: 98 % (04/15/25 1145) Vital Signs (24h Range):  Temp:  [98.2 °F (36.8 °C)-98.7 °F (37.1 °C)] 98.2 °F (36.8 °C)  Pulse:  [63-86] 63  Resp:  [16-18] 18  SpO2:  [97 %-99 %] 98 %  BP: ()/(65-74) 97/65     Weight: 74.8 kg (164 lb 14.5 oz)  Body mass index is 25.07 kg/m².      Physical Exam  Vitals and nursing note reviewed.   Constitutional:       General: He is not in acute distress.     Appearance: Normal appearance. He is not ill-appearing, toxic-appearing or diaphoretic.   HENT:      Head: Normocephalic and atraumatic.      Mouth/Throat:      Mouth: Mucous membranes are moist.   Eyes:      Conjunctiva/sclera: Conjunctivae normal.   Cardiovascular:      Rate and Rhythm: Normal rate and regular rhythm.   Pulmonary:      Effort: Pulmonary effort is normal. No respiratory distress.   Abdominal:      General: There is no distension.      Palpations: Abdomen is soft.      Tenderness: There is abdominal tenderness. There is no guarding or rebound.   Musculoskeletal:         General: Normal range of motion.      Cervical back: Normal range of motion.   Skin:     General: Skin is warm.      Capillary Refill: Capillary refill takes less than 2 seconds.   Neurological:      Mental Status: He is alert and oriented to person, place, and time. Mental status is at baseline.      Motor: No weakness.      Gait: Gait normal.   Psychiatric:         Mood and Affect: Mood normal.         Behavior: Behavior normal.         Thought Content: Thought content normal.         Judgment: Judgment normal.            I have reviewed all pertinent lab results within the past 24 hours.  CBC:   Recent Labs   Lab 04/15/25  0409   WBC 18.20*   RBC 5.26   HGB 15.2   HCT 47.0      MCV 89   MCH 28.9   MCHC 32.3     CMP:   Recent Labs   Lab 04/15/25  0409   CALCIUM 9.7   ALBUMIN 4.5      K 4.0   CO2 27      BUN 20   CREATININE 1.0   ALKPHOS 82   ALT 11   AST 17   BILITOT 0.3       Significant Diagnostics:  I have reviewed all pertinent imaging results/findings within the past 24 hours.    Assessment/Plan:     * Calculus of gallbladder with acute cholecystitis without obstruction  -Confirmed on US; no biliary dilatation   -To OR tomorrow 4/16 for lap vs open cholecystectomy   -CLD  -NPO after  midnight  -IV Zosyn  -WBCs 18  -Multimodal pain regimen  -Antiemetics    Tobacco dependency  Dangers of cigarette smoking were reviewed with patient in detail. Patient was Counseled for 3-10 minutes. Nicotine replacement options were discussed. Nicotine replacement was discussed- prescribed      VTE Risk Mitigation (From admission, onward)           Ordered     IP VTE LOW RISK PATIENT  Once         04/15/25 1118     Place TAMY hose  Until discontinued         04/15/25 1118     Place sequential compression device  Until discontinued         04/15/25 1118                    MEMO PERRY NP  General Surgery  [facility]

## 2025-04-15 NOTE — PLAN OF CARE
"[place] - Med Surg  Initial Discharge Assessment       Primary Care Provider: Yamilet Hale FNP    Admission Diagnosis: Cholecystitis [K81.9]    Admission Date: 4/15/2025  Expected Discharge Date:          Payor: BLUE CROSS BLUE SHIELD / Plan: [place] / Product Type: HMO /     Extended Emergency Contact Information  Primary Emergency Contact: Nahomy Walsh   [facility]  Home Phone: [phone]  Mobile Phone: [phone]  Relation: Daughter  Secondary Emergency Contact: MANOJ PERDOMO  Mobile Phone: [phone]  Relation: Father  Preferred language: English   needed? No    Discharge Plan A: Home  Discharge Plan B: Home      [facility] [address] [place] [address] [address] [address] [facility]  [address] [facility] [address] [facility] [address]  Phone: [phone] Fax: [phone]      Initial Assessment (most recent)       Adult Discharge Assessment - 04/15/25 1618          Discharge Assessment    Assessment Type Discharge Planning Assessment     Confirmed/corrected address, phone number and insurance Yes     Confirmed Demographics Correct on Facesheet     Source of Information patient     When was your last doctors appointment? --   "About a month ago."    People in Home alone     Prior to hospitilization cognitive status: Alert/Oriented     Current cognitive status: Alert/Oriented     Walking or Climbing Stairs Difficulty no   The patient stated that he is indpendent.    Dressing/Bathing Difficulty no     Home Layout Able to live on 1st floor     Equipment Currently Used at Home none     Readmission within 30 days? No     Patient currently being followed by outpatient case management? No     Do you currently have service(s) that help you manage your care at home? No     Do you take prescription medications? Yes     Do you have prescription coverage? Yes     Coverage [facility] [place]     Do you have any problems affording any of your prescribed medications? TBD     Is the " patient taking medications as prescribed? yes     Who is going to help you get home at discharge? self     How do you get to doctors appointments? car, drives self     Are you on dialysis? No     Do you take coumadin? No     Discharge Plan A Home     Discharge Plan B Home     DME Needed Upon Discharge  other (see comments)   TBD    Discharge Plan discussed with: Patient;Parent(s)        Physical Activity    On average, how many days per week do you engage in moderate to strenuous exercise (like a brisk walk)? 2 days     On average, how many minutes do you engage in exercise at this level? 60 min        Financial Resource Strain    How hard is it for you to pay for the very basics like food, housing, medical care, and heating? Not very hard        Housing Stability    In the last 12 months, was there a time when you were not able to pay the mortgage or rent on time? No     At any time in the past 12 months, were you homeless or living in a shelter (including now)? No        Transportation Needs    In the past 12 months, has lack of transportation kept you from medical appointments or from getting medications? No     In the past 12 months, has lack of transportation kept you from meetings, work, or from getting things needed for daily living? No        Food Insecurity    Within the past 12 months, you worried that your food would run out before you got the money to buy more. Never true     Within the past 12 months, the food you bought just didn't last and you didn't have money to get more. Never true        Stress    Do you feel stress - tense, restless, nervous, or anxious, or unable to sleep at night because your mind is troubled all the time - these days? Only a little        Social Isolation    How often do you feel lonely or isolated from those around you?  Rarely        Alcohol Use    Q1: How often do you have a drink containing alcohol? Monthly or less   socially    Q2: How many drinks containing alcohol do  you have on a typical day when you are drinking? 1 or 2     Q3: How often do you have six or more drinks on one occasion? Never        Utilities    In the past 12 months has the electric, gas, oil, or water company threatened to shut off services in your home? No        Health Literacy    How often do you need to have someone help you when you read instructions, pamphlets, or other written material from your doctor or pharmacy? Rarely                 Discharge assessment completed with the patient and his family. The patient gave me permission to complete the assessment with his parents present. The patient reports he is independent, and he still drives. The patient stated that he lives alone. I attempted to offer the patient resources for tobacco usage, but he declined.     Discharge planning checklist given to the patient/family with instructions to contact  for any needs.  SW will follow as needed.

## 2025-04-16 ENCOUNTER — ANESTHESIA (OUTPATIENT)
Dept: SURGERY | Facility: HOSPITAL | Age: 55
End: 2025-04-16
Payer: COMMERCIAL

## 2025-04-16 LAB
ABSOLUTE EOSINOPHIL (OHS): 0.36 K/UL
ABSOLUTE MONOCYTE (OHS): 0.67 K/UL (ref 0.3–1)
ABSOLUTE NEUTROPHIL COUNT (OHS): 3.13 K/UL (ref 1.8–7.7)
ALBUMIN SERPL BCP-MCNC: 3.5 G/DL (ref 3.5–5.2)
ALP SERPL-CCNC: 63 UNIT/L (ref 40–150)
ALT SERPL W/O P-5'-P-CCNC: 9 UNIT/L (ref 10–44)
ANION GAP (OHS): 6 MMOL/L (ref 8–16)
AST SERPL-CCNC: 14 UNIT/L (ref 11–45)
BASOPHILS # BLD AUTO: 0.05 K/UL
BASOPHILS NFR BLD AUTO: 0.7 %
BILIRUB SERPL-MCNC: 0.8 MG/DL (ref 0.1–1)
BUN SERPL-MCNC: 9 MG/DL (ref 6–20)
CALCIUM SERPL-MCNC: 8.3 MG/DL (ref 8.7–10.5)
CHLORIDE SERPL-SCNC: 109 MMOL/L (ref 95–110)
CO2 SERPL-SCNC: 22 MMOL/L (ref 23–29)
CREAT SERPL-MCNC: 1 MG/DL (ref 0.5–1.4)
ERYTHROCYTE [DISTWIDTH] IN BLOOD BY AUTOMATED COUNT: 12.6 % (ref 11.5–14.5)
GFR SERPLBLD CREATININE-BSD FMLA CKD-EPI: >60 ML/MIN/1.73/M2
GLUCOSE SERPL-MCNC: 93 MG/DL (ref 70–110)
HCT VFR BLD AUTO: 42.6 % (ref 40–54)
HGB BLD-MCNC: 13.7 GM/DL (ref 14–18)
IMM GRANULOCYTES # BLD AUTO: 0.02 K/UL (ref 0–0.04)
IMM GRANULOCYTES NFR BLD AUTO: 0.3 % (ref 0–0.5)
LYMPHOCYTES # BLD AUTO: 3.08 K/UL (ref 1–4.8)
MCH RBC QN AUTO: 28.5 PG (ref 27–31)
MCHC RBC AUTO-ENTMCNC: 32.2 G/DL (ref 32–36)
MCV RBC AUTO: 89 FL (ref 82–98)
NUCLEATED RBC (/100WBC) (OHS): 0 /100 WBC
PLATELET # BLD AUTO: 270 K/UL (ref 150–450)
PMV BLD AUTO: 8.8 FL (ref 9.2–12.9)
POTASSIUM SERPL-SCNC: 3.8 MMOL/L (ref 3.5–5.1)
PROT SERPL-MCNC: 5.8 GM/DL (ref 6–8.4)
RBC # BLD AUTO: 4.81 M/UL (ref 4.6–6.2)
RELATIVE EOSINOPHIL (OHS): 4.9 %
RELATIVE LYMPHOCYTE (OHS): 42.1 % (ref 18–48)
RELATIVE MONOCYTE (OHS): 9.2 % (ref 4–15)
RELATIVE NEUTROPHIL (OHS): 42.8 % (ref 38–73)
SODIUM SERPL-SCNC: 137 MMOL/L (ref 136–145)
WBC # BLD AUTO: 7.31 K/UL (ref 3.9–12.7)

## 2025-04-16 PROCEDURE — 47562 LAPAROSCOPIC CHOLECYSTECTOMY: CPT | Mod: ,,, | Performed by: STUDENT IN AN ORGANIZED HEALTH CARE EDUCATION/TRAINING PROGRAM

## 2025-04-16 PROCEDURE — 96376 TX/PRO/DX INJ SAME DRUG ADON: CPT

## 2025-04-16 PROCEDURE — 37000009 HC ANESTHESIA EA ADD 15 MINS: Performed by: STUDENT IN AN ORGANIZED HEALTH CARE EDUCATION/TRAINING PROGRAM

## 2025-04-16 PROCEDURE — S4991 NICOTINE PATCH NONLEGEND: HCPCS | Performed by: STUDENT IN AN ORGANIZED HEALTH CARE EDUCATION/TRAINING PROGRAM

## 2025-04-16 PROCEDURE — 36000708 HC OR TIME LEV III 1ST 15 MIN: Performed by: STUDENT IN AN ORGANIZED HEALTH CARE EDUCATION/TRAINING PROGRAM

## 2025-04-16 PROCEDURE — 63600175 PHARM REV CODE 636 W HCPCS: Performed by: STUDENT IN AN ORGANIZED HEALTH CARE EDUCATION/TRAINING PROGRAM

## 2025-04-16 PROCEDURE — 80053 COMPREHEN METABOLIC PANEL: CPT

## 2025-04-16 PROCEDURE — 71000033 HC RECOVERY, INTIAL HOUR: Performed by: STUDENT IN AN ORGANIZED HEALTH CARE EDUCATION/TRAINING PROGRAM

## 2025-04-16 PROCEDURE — 36000709 HC OR TIME LEV III EA ADD 15 MIN: Performed by: STUDENT IN AN ORGANIZED HEALTH CARE EDUCATION/TRAINING PROGRAM

## 2025-04-16 PROCEDURE — 27201423 OPTIME MED/SURG SUP & DEVICES STERILE SUPPLY: Performed by: STUDENT IN AN ORGANIZED HEALTH CARE EDUCATION/TRAINING PROGRAM

## 2025-04-16 PROCEDURE — 96366 THER/PROPH/DIAG IV INF ADDON: CPT

## 2025-04-16 PROCEDURE — 25000003 PHARM REV CODE 250: Performed by: NURSE ANESTHETIST, CERTIFIED REGISTERED

## 2025-04-16 PROCEDURE — 25000003 PHARM REV CODE 250: Performed by: STUDENT IN AN ORGANIZED HEALTH CARE EDUCATION/TRAINING PROGRAM

## 2025-04-16 PROCEDURE — 63600175 PHARM REV CODE 636 W HCPCS: Performed by: EMERGENCY MEDICINE

## 2025-04-16 PROCEDURE — G0378 HOSPITAL OBSERVATION PER HR: HCPCS

## 2025-04-16 PROCEDURE — 63600175 PHARM REV CODE 636 W HCPCS: Performed by: NURSE ANESTHETIST, CERTIFIED REGISTERED

## 2025-04-16 PROCEDURE — 37000008 HC ANESTHESIA 1ST 15 MINUTES: Performed by: STUDENT IN AN ORGANIZED HEALTH CARE EDUCATION/TRAINING PROGRAM

## 2025-04-16 PROCEDURE — 85025 COMPLETE CBC W/AUTO DIFF WBC: CPT

## 2025-04-16 PROCEDURE — 36415 COLL VENOUS BLD VENIPUNCTURE: CPT

## 2025-04-16 RX ORDER — ONDANSETRON HYDROCHLORIDE 2 MG/ML
4 INJECTION, SOLUTION INTRAVENOUS DAILY PRN
Status: DISCONTINUED | OUTPATIENT
Start: 2025-04-16 | End: 2025-04-16

## 2025-04-16 RX ORDER — MORPHINE SULFATE 4 MG/ML
4 INJECTION, SOLUTION INTRAMUSCULAR; INTRAVENOUS EVERY 4 HOURS PRN
Status: DISCONTINUED | OUTPATIENT
Start: 2025-04-16 | End: 2025-04-17 | Stop reason: HOSPADM

## 2025-04-16 RX ORDER — NEOSTIGMINE METHYLSULFATE 5 MG/5 ML
SYRINGE (ML) INTRAVENOUS
Status: DISCONTINUED | OUTPATIENT
Start: 2025-04-16 | End: 2025-04-16

## 2025-04-16 RX ORDER — ACETAMINOPHEN 10 MG/ML
INJECTION, SOLUTION INTRAVENOUS
Status: DISCONTINUED | OUTPATIENT
Start: 2025-04-16 | End: 2025-04-16

## 2025-04-16 RX ORDER — METHOCARBAMOL 750 MG/1
750 TABLET, FILM COATED ORAL 4 TIMES DAILY
Status: DISCONTINUED | OUTPATIENT
Start: 2025-04-16 | End: 2025-04-17 | Stop reason: HOSPADM

## 2025-04-16 RX ORDER — PHENYLEPHRINE HYDROCHLORIDE 10 MG/ML
INJECTION INTRAVENOUS
Status: DISCONTINUED | OUTPATIENT
Start: 2025-04-16 | End: 2025-04-16

## 2025-04-16 RX ORDER — ROCURONIUM BROMIDE 10 MG/ML
INJECTION, SOLUTION INTRAVENOUS
Status: DISCONTINUED | OUTPATIENT
Start: 2025-04-16 | End: 2025-04-16

## 2025-04-16 RX ORDER — HYDROCODONE BITARTRATE AND ACETAMINOPHEN 10; 325 MG/1; MG/1
1 TABLET ORAL EVERY 6 HOURS PRN
Status: DISCONTINUED | OUTPATIENT
Start: 2025-04-16 | End: 2025-04-17 | Stop reason: HOSPADM

## 2025-04-16 RX ORDER — GLYCOPYRROLATE 0.2 MG/ML
INJECTION, SOLUTION INTRAMUSCULAR; INTRAVENOUS
Status: DISCONTINUED | OUTPATIENT
Start: 2025-04-16 | End: 2025-04-16

## 2025-04-16 RX ORDER — MIDAZOLAM HYDROCHLORIDE 1 MG/ML
INJECTION INTRAMUSCULAR; INTRAVENOUS
Status: DISCONTINUED | OUTPATIENT
Start: 2025-04-16 | End: 2025-04-16

## 2025-04-16 RX ORDER — GLUCAGON 1 MG
1 KIT INJECTION
Status: DISCONTINUED | OUTPATIENT
Start: 2025-04-16 | End: 2025-04-16

## 2025-04-16 RX ORDER — MORPHINE SULFATE 4 MG/ML
4 INJECTION, SOLUTION INTRAMUSCULAR; INTRAVENOUS EVERY 5 MIN PRN
Status: DISCONTINUED | OUTPATIENT
Start: 2025-04-16 | End: 2025-04-16

## 2025-04-16 RX ORDER — DIPHENHYDRAMINE HYDROCHLORIDE 50 MG/ML
25 INJECTION, SOLUTION INTRAMUSCULAR; INTRAVENOUS EVERY 6 HOURS PRN
Status: DISCONTINUED | OUTPATIENT
Start: 2025-04-16 | End: 2025-04-16

## 2025-04-16 RX ORDER — ONDANSETRON HYDROCHLORIDE 2 MG/ML
INJECTION, SOLUTION INTRAMUSCULAR; INTRAVENOUS
Status: DISCONTINUED | OUTPATIENT
Start: 2025-04-16 | End: 2025-04-16

## 2025-04-16 RX ORDER — PROPOFOL 10 MG/ML
VIAL (ML) INTRAVENOUS
Status: DISCONTINUED | OUTPATIENT
Start: 2025-04-16 | End: 2025-04-16

## 2025-04-16 RX ORDER — FENTANYL CITRATE 50 UG/ML
INJECTION, SOLUTION INTRAMUSCULAR; INTRAVENOUS
Status: DISCONTINUED | OUTPATIENT
Start: 2025-04-16 | End: 2025-04-16

## 2025-04-16 RX ORDER — BUPIVACAINE HYDROCHLORIDE AND EPINEPHRINE 2.5; 5 MG/ML; UG/ML
INJECTION, SOLUTION EPIDURAL; INFILTRATION; INTRACAUDAL; PERINEURAL
Status: DISCONTINUED | OUTPATIENT
Start: 2025-04-16 | End: 2025-04-16 | Stop reason: HOSPADM

## 2025-04-16 RX ORDER — HYDROMORPHONE HYDROCHLORIDE 2 MG/ML
INJECTION, SOLUTION INTRAMUSCULAR; INTRAVENOUS; SUBCUTANEOUS
Status: DISCONTINUED | OUTPATIENT
Start: 2025-04-16 | End: 2025-04-16

## 2025-04-16 RX ORDER — SODIUM CHLORIDE 9 MG/ML
INJECTION, SOLUTION INTRAVENOUS CONTINUOUS
Status: DISCONTINUED | OUTPATIENT
Start: 2025-04-16 | End: 2025-04-16

## 2025-04-16 RX ORDER — HYDROMORPHONE HYDROCHLORIDE 1 MG/ML
0.5 INJECTION, SOLUTION INTRAMUSCULAR; INTRAVENOUS; SUBCUTANEOUS EVERY 5 MIN PRN
Status: DISCONTINUED | OUTPATIENT
Start: 2025-04-16 | End: 2025-04-16

## 2025-04-16 RX ORDER — LIDOCAINE HYDROCHLORIDE 10 MG/ML
INJECTION, SOLUTION EPIDURAL; INFILTRATION; INTRACAUDAL; PERINEURAL
Status: DISCONTINUED | OUTPATIENT
Start: 2025-04-16 | End: 2025-04-16

## 2025-04-16 RX ADMIN — FAMOTIDINE 20 MG: 10 INJECTION, SOLUTION INTRAVENOUS at 08:04

## 2025-04-16 RX ADMIN — NICOTINE 1 PATCH: 14 PATCH, EXTENDED RELEASE TRANSDERMAL at 09:04

## 2025-04-16 RX ADMIN — ACETAMINOPHEN 1000 MG: 10 INJECTION, SOLUTION INTRAVENOUS at 03:04

## 2025-04-16 RX ADMIN — FAMOTIDINE 20 MG: 10 INJECTION, SOLUTION INTRAVENOUS at 09:04

## 2025-04-16 RX ADMIN — FENTANYL CITRATE 50 MCG: 50 INJECTION, SOLUTION INTRAMUSCULAR; INTRAVENOUS at 03:04

## 2025-04-16 RX ADMIN — PIPERACILLIN AND TAZOBACTAM 4.5 G: 4; .5 INJECTION, POWDER, LYOPHILIZED, FOR SOLUTION INTRAVENOUS; PARENTERAL at 03:04

## 2025-04-16 RX ADMIN — PROPOFOL 200 MG: 10 INJECTION, EMULSION INTRAVENOUS at 03:04

## 2025-04-16 RX ADMIN — MIDAZOLAM 2 MG: 1 INJECTION INTRAMUSCULAR; INTRAVENOUS at 03:04

## 2025-04-16 RX ADMIN — HYDROMORPHONE HYDROCHLORIDE 0.5 MG: 2 INJECTION, SOLUTION INTRAMUSCULAR; INTRAVENOUS; SUBCUTANEOUS at 04:04

## 2025-04-16 RX ADMIN — PIPERACILLIN AND TAZOBACTAM 4.5 G: 4; .5 INJECTION, POWDER, LYOPHILIZED, FOR SOLUTION INTRAVENOUS; PARENTERAL at 11:04

## 2025-04-16 RX ADMIN — ROCURONIUM BROMIDE 30 MG: 10 INJECTION, SOLUTION INTRAVENOUS at 03:04

## 2025-04-16 RX ADMIN — FENTANYL CITRATE 100 MCG: 50 INJECTION, SOLUTION INTRAMUSCULAR; INTRAVENOUS at 03:04

## 2025-04-16 RX ADMIN — LIDOCAINE HYDROCHLORIDE 50 MG: 10 INJECTION, SOLUTION EPIDURAL; INFILTRATION; INTRACAUDAL; PERINEURAL at 03:04

## 2025-04-16 RX ADMIN — PHENYLEPHRINE HYDROCHLORIDE 100 MCG: 10 INJECTION INTRAVENOUS at 04:04

## 2025-04-16 RX ADMIN — METHOCARBAMOL 750 MG: 750 TABLET ORAL at 08:04

## 2025-04-16 RX ADMIN — GLYCOPYRROLATE 0.8 MG: 0.2 INJECTION, SOLUTION INTRAMUSCULAR; INTRAVENOUS at 04:04

## 2025-04-16 RX ADMIN — ONDANSETRON 4 MG: 2 INJECTION INTRAMUSCULAR; INTRAVENOUS at 03:04

## 2025-04-16 RX ADMIN — Medication 5 MG: at 04:04

## 2025-04-16 RX ADMIN — METHOCARBAMOL 750 MG: 750 TABLET ORAL at 06:04

## 2025-04-16 NOTE — OP NOTE
[facility] - OR Periop Services  General Surgery Department  Operative Note    SUMMARY     Date of Procedure: 4/15/2025 - 4/16/2025    Procedure: Procedure(s) (LRB):  CHOLECYSTECTOMY, LAPAROSCOPIC: 99644 (CPT®)       Surgeon(s) and Role:  Morena Bang MD     Pre-Operative Diagnosis: Pre-Op Diagnosis Codes:      * Cholecystitis [K81.9]    Post-Operative Diagnosis: Same         Anesthesia: General    Findings:  Omental adhesions surrounding the gallbladder suggestive of acute on chronic cholecystitis  Critical view of safety achieved  Cystic duct and artery clipped x2 and ligated in standard fashion  gallbladder removed intact  Umbilical incision repaired with 0 Vicryl sutures    Indications for the Procedure:  53yo male who presents with acute calculus cholecystitis as demonstrated by pre-op Ultrasound and physical exam.  Risks and benefits of surgical intervention versus medical management explained to patient.  Risks of lap cholecystectomy including but not limited to bleeding infection, bile leak, damage to common bile duct and post cholecystectomy syndrome explained to patient.  Patient voiced understanding and elected to undergo lap vs open cholecystectomy.     Operative Conduct in Detail:   After informed consent was obtained, the patient was rolled into the OR and placed in the supine position where endotracheal intubation was initiated.  A time out was performed in the OR all were in agreement.  Pre-operative antibiotics were hung.      The patient's abdomen was prepped and draped in standard fashion.  An infraumbilical incision was made, and the abdominal cavity was accessed via the Hilton technique.  0-Vicryl sutures were placed at the apex of the facial defect.   Pneumoperitoneum was then created with CO2 and patient tolerated well without any adverse changes in vital signs or peak airway pressures. Three additional trocars were introduced under direct vision in the subxyphoid, right  midclavicular subcostal, and RLQ subcostal positions.      The gallbladder was identified, the fundus grasped and retracted cephalad. Adhesions were lysed bluntly and with the electrocautery where indicated, taking care not to injure any adjacent organs or viscus. The infundibulum was grasped and retracted laterally, exposing the peritoneum overlying the triangle of Calot. This was then divided and exposed in a blunt fashion. The cystic duct was clearly identified and bluntly dissected circumferentially. The junctions of the gallbladder, cystic duct and common bile duct were clearly identified prior to the division of any linear structure.     The cystic duct was then clipped three times with two on the stay side and divided sharply. The cystic artery was identified and ligated/divided with clips in a similar fashion. The gallbladder was dissected from the liver bed using hook electrocautery. gallbladder removed intact The liver bed was inspected and there was no active bleeding or oozing of bile.    Pneumoperitoneum was completely reduced after viewing removal of all trocars under direct vision. The wound was thoroughly irrigated and the fascia was then closed with interrupted Vicryl suture; the skin was then closed with subcuticular Monocryl.   Sterile dressing was applied.  The patient was awakened from anesthesia and extubated in the OR without issue.  All lap and instrument counts were correct x2 prior to and after skin closure.     Complications: No    Estimated Blood Loss (EBL): Minimal           Implants: * No implants in log *    Specimens:   ID Type Source Tests Collected by Time Destination   1 :  Tissue Gallbladder SPECIMEN TO PATHOLOGY Morena Bang MD 4/16/2025 1728                 Condition: Good    Disposition: PACU - hemodynamically stable.      Morena Bang MD  General Surgery   [phone]

## 2025-04-16 NOTE — ANESTHESIA PREPROCEDURE EVALUATION
04/16/2025  Leyla Rayo is a 54 y.o., male.      Pre-op Assessment    I have reviewed the Patient Summary Reports.    I have reviewed the NPO Status.   I have reviewed the Medications.     Review of Systems  Anesthesia Hx:  No problems with previous Anesthesia             Denies Family Hx of Anesthesia complications.    Denies Personal Hx of Anesthesia complications.                    Social:  Smoker       Cardiovascular:  Cardiovascular Normal                  ECG has been reviewed.                            Pulmonary:  Pulmonary Normal                       Renal/:  Renal/ Normal                 Hepatic/GI:  Hepatic/GI Normal                    Neurological:  Neurology Normal                                      Endocrine:  Endocrine Normal            Psych:  Psychiatric History  depression PTSD             Lab Results   Component Value Date    WBC 7.31 04/16/2025    HGB 13.7 (L) 04/16/2025    HCT 42.6 04/16/2025    MCV 89 04/16/2025     04/16/2025      CMP  Sodium   Date Value Ref Range Status   04/15/2025 140 136 - 145 mmol/L Final   04/27/2022 143 136 - 145 mmol/L Final     Potassium   Date Value Ref Range Status   04/15/2025 4.0 3.5 - 5.1 mmol/L Final   04/27/2022 4.3 3.5 - 5.1 mmol/L Final     Chloride   Date Value Ref Range Status   04/15/2025 101 95 - 110 mmol/L Final   04/27/2022 108 95 - 110 mmol/L Final     CO2   Date Value Ref Range Status   04/15/2025 27 23 - 29 mmol/L Final   04/27/2022 29 23 - 29 mmol/L Final     Glucose   Date Value Ref Range Status   04/27/2022 110 70 - 110 mg/dL Final     BUN   Date Value Ref Range Status   04/15/2025 20 6 - 20 mg/dL Final     Creatinine   Date Value Ref Range Status   04/15/2025 1.0 0.5 - 1.4 mg/dL Final     Calcium   Date Value Ref Range Status   04/15/2025 9.7 8.7 - 10.5 mg/dL Final   04/27/2022 9.3 8.7 - 10.5 mg/dL Final     Total Protein    Date Value Ref Range Status   04/27/2022 7.2 6.0 - 8.4 g/dL Final     Albumin   Date Value Ref Range Status   04/15/2025 4.5 3.5 - 5.2 g/dL Final   04/27/2022 3.7 3.5 - 5.2 g/dL Final     Total Bilirubin   Date Value Ref Range Status   04/27/2022 0.3 0.1 - 1.0 mg/dL Final     Comment:     For infants and newborns, interpretation of results should be based  on gestational age, weight and in agreement with clinical  observations.    Premature Infant recommended reference ranges:  Up to 24 hours.............<8.0 mg/dL  Up to 48 hours............<12.0 mg/dL  3-5 days..................<15.0 mg/dL  6-29 days.................<15.0 mg/dL    For patients on Eltrombopag therapy, use of Dimension [place] TBIL is   not   recommended.       Bilirubin Total   Date Value Ref Range Status   04/15/2025 0.3 0.1 - 1.0 mg/dL Final     Comment:     For infants and newborns, interpretation of results should be based   on gestational age, weight and in agreement with clinical   observations.    Premature Infant recommended reference ranges:   0-24 hours:  <8.0 mg/dL   24-48 hours: <12.0 mg/dL   3-5 days:    <15.0 mg/dL   6-29 days:   <15.0 mg/dL     Alkaline Phosphatase   Date Value Ref Range Status   04/27/2022 88 55 - 135 U/L Final     ALP   Date Value Ref Range Status   04/15/2025 82 40 - 150 unit/L Final     AST   Date Value Ref Range Status   04/15/2025 17 11 - 45 unit/L Final   04/27/2022 12 10 - 40 U/L Final     ALT   Date Value Ref Range Status   04/15/2025 11 10 - 44 unit/L Final   04/27/2022 21 10 - 44 U/L Final     Anion Gap   Date Value Ref Range Status   04/15/2025 12 8 - 16 mmol/L Final     eGFR   Date Value Ref Range Status   04/15/2025 >60 >60 mL/min/1.73/m2 Final     Comment:     Estimated GFR calculated using the CKD-EPI creatinine (2021) equation.   04/22/2020 95 >60- mL/min/1.73m Final        Physical Exam  General: Well nourished    Airway:  Mallampati: II / I  Mouth Opening: Normal  TM Distance: Normal  Tongue:  Normal  Neck ROM: Normal ROM    Dental:    Chest/Lungs:  Clear to auscultation    Heart:  Rate: Normal  Rhythm: Regular Rhythm  Sounds: Normal        Anesthesia Plan  Type of Anesthesia, risks & benefits discussed:    Anesthesia Type: Gen ETT  Intra-op Monitoring Plan: Standard ASA Monitors  Post Op Pain Control Plan: multimodal analgesia  Induction:  IV  Airway Plan: Direct  Informed Consent: Informed consent signed with the Patient and all parties understand the risks and agree with anesthesia plan.  All questions answered.   ASA Score: 2  Day of Surgery Review of History & Physical: I have interviewed and examined the patient. I have reviewed the patient's H&P dated: There are no significant changes.     Ready For Surgery From Anesthesia Perspective.     .       Orthopedic General

## 2025-04-16 NOTE — TRANSFER OF CARE
Anesthesia Transfer of Care Note    Patient: Leyla Rayo    Procedure(s) Performed: Procedure(s) (LRB):  CHOLECYSTECTOMY, LAPAROSCOPIC (N/A)    Patient location: PACU    Anesthesia Type: general    Transport from OR: Transported from OR on room air with adequate spontaneous ventilation    Post pain: adequate analgesia    Post assessment: no apparent anesthetic complications    Post vital signs: stable    Level of consciousness: awake    Nausea/Vomiting: no nausea/vomiting    Complications: none    Transfer of care protocol was followed      Last vitals:   132/72  16 RR  100% O2 SAT  37 C TEMP  82 HR

## 2025-04-16 NOTE — PLAN OF CARE
Plan of Care reviewed with the patient. Vital sign are stable. Pt went to surgery for Choleystomy, Lap @ 1515. Pt voided right after the prcodeure with no problems. Pt is on clear liquid diet with no problems. Ongoing patient care throughout the day shift.

## 2025-04-16 NOTE — INTERVAL H&P NOTE
Patient seen and examined.  No interval change since the below obtained H&P  Informed consent verified and surgical site marked  NPO since midnight  All questions and concerns addressed  To OR for lap vs open cholecystectomy     Morena Bang MD  General Surgery   [phone]

## 2025-04-16 NOTE — ANESTHESIA PROCEDURE NOTES
Intubation    Date/Time: 4/16/2025 3:46 PM    Performed by: Garcia Alexandra CRNA  Authorized by: Garcia Alexandra CRNA    Intubation:     Induction:  Intravenous    Intubated:  Postinduction    Mask Ventilation:  Not attempted    Attempts:  1    Attempted By:  CRNA    Method of Intubation:  Direct    Blade:  Rita 4    Laryngeal View Grade: Grade I - full view of cords      Difficult Airway Encountered?: No      Complications:  None    Airway Device:  Oral endotracheal tube    Airway Device Size:  7.5    Style/Cuff Inflation:  Cuffed    Inflation Amount (mL):  6    Tube secured:  21    Secured at:  The lips    Placement Verified By:  Capnometry    Complicating Factors:  None    Findings Post-Intubation:  BS equal bilateral and atraumatic/condition of teeth unchanged

## 2025-04-17 ENCOUNTER — HOSPITAL ENCOUNTER (EMERGENCY)
Facility: HOSPITAL | Age: 55
Discharge: HOME | End: 2025-04-17
Attending: EMERGENCY MEDICINE
Payer: COMMERCIAL

## 2025-04-17 VITALS
HEART RATE: 71 BPM | DIASTOLIC BLOOD PRESSURE: 70 MMHG | WEIGHT: 164.88 LBS | TEMPERATURE: 98 F | HEIGHT: 68 IN | OXYGEN SATURATION: 95 % | SYSTOLIC BLOOD PRESSURE: 120 MMHG | BODY MASS INDEX: 24.99 KG/M2 | RESPIRATION RATE: 18 BRPM

## 2025-04-17 VITALS
HEART RATE: 65 BPM | TEMPERATURE: 98 F | SYSTOLIC BLOOD PRESSURE: 124 MMHG | DIASTOLIC BLOOD PRESSURE: 68 MMHG | OXYGEN SATURATION: 100 % | RESPIRATION RATE: 18 BRPM

## 2025-04-17 DIAGNOSIS — R53.83 FATIGUE, UNSPECIFIED TYPE: Primary | ICD-10-CM

## 2025-04-17 DIAGNOSIS — E86.0 DEHYDRATION: ICD-10-CM

## 2025-04-17 LAB
ABSOLUTE EOSINOPHIL (OHS): 0.14 K/UL
ABSOLUTE EOSINOPHIL (OHS): 0.32 K/UL
ABSOLUTE MONOCYTE (OHS): 1.04 K/UL (ref 0.3–1)
ABSOLUTE MONOCYTE (OHS): 1.05 K/UL (ref 0.3–1)
ABSOLUTE NEUTROPHIL COUNT (OHS): 13.73 K/UL (ref 1.8–7.7)
ABSOLUTE NEUTROPHIL COUNT (OHS): 8.04 K/UL (ref 1.8–7.7)
ALBUMIN SERPL BCP-MCNC: 3.7 G/DL (ref 3.5–5.2)
ALBUMIN SERPL BCP-MCNC: 3.9 G/DL (ref 3.5–5.2)
ALP SERPL-CCNC: 65 UNIT/L (ref 40–150)
ALP SERPL-CCNC: 73 UNIT/L (ref 40–150)
ALT SERPL W/O P-5'-P-CCNC: 23 UNIT/L (ref 10–44)
ALT SERPL W/O P-5'-P-CCNC: 24 UNIT/L (ref 10–44)
ANION GAP (OHS): 8 MMOL/L (ref 8–16)
ANION GAP (OHS): 8 MMOL/L (ref 8–16)
AST SERPL-CCNC: 24 UNIT/L (ref 11–45)
AST SERPL-CCNC: 39 UNIT/L (ref 11–45)
BASOPHILS # BLD AUTO: 0.04 K/UL
BASOPHILS # BLD AUTO: 0.05 K/UL
BASOPHILS NFR BLD AUTO: 0.2 %
BASOPHILS NFR BLD AUTO: 0.4 %
BILIRUB SERPL-MCNC: 0.5 MG/DL (ref 0.1–1)
BILIRUB SERPL-MCNC: 0.6 MG/DL (ref 0.1–1)
BUN SERPL-MCNC: 5 MG/DL (ref 6–20)
BUN SERPL-MCNC: 7 MG/DL (ref 6–20)
CALCIUM SERPL-MCNC: 8.6 MG/DL (ref 8.7–10.5)
CALCIUM SERPL-MCNC: 8.8 MG/DL (ref 8.7–10.5)
CHLORIDE SERPL-SCNC: 107 MMOL/L (ref 95–110)
CHLORIDE SERPL-SCNC: 107 MMOL/L (ref 95–110)
CO2 SERPL-SCNC: 24 MMOL/L (ref 23–29)
CO2 SERPL-SCNC: 25 MMOL/L (ref 23–29)
CREAT SERPL-MCNC: 1 MG/DL (ref 0.5–1.4)
CREAT SERPL-MCNC: 1.1 MG/DL (ref 0.5–1.4)
ERYTHROCYTE [DISTWIDTH] IN BLOOD BY AUTOMATED COUNT: 12.9 % (ref 11.5–14.5)
ERYTHROCYTE [DISTWIDTH] IN BLOOD BY AUTOMATED COUNT: 12.9 % (ref 11.5–14.5)
GFR SERPLBLD CREATININE-BSD FMLA CKD-EPI: >60 ML/MIN/1.73/M2
GFR SERPLBLD CREATININE-BSD FMLA CKD-EPI: >60 ML/MIN/1.73/M2
GLUCOSE SERPL-MCNC: 109 MG/DL (ref 70–110)
GLUCOSE SERPL-MCNC: 83 MG/DL (ref 70–110)
HCT VFR BLD AUTO: 45.2 % (ref 40–54)
HCT VFR BLD AUTO: 47.2 % (ref 40–54)
HGB BLD-MCNC: 14.3 GM/DL (ref 14–18)
HGB BLD-MCNC: 14.8 GM/DL (ref 14–18)
IMM GRANULOCYTES # BLD AUTO: 0.04 K/UL (ref 0–0.04)
IMM GRANULOCYTES # BLD AUTO: 0.08 K/UL (ref 0–0.04)
IMM GRANULOCYTES NFR BLD AUTO: 0.3 % (ref 0–0.5)
IMM GRANULOCYTES NFR BLD AUTO: 0.5 % (ref 0–0.5)
LYMPHOCYTES # BLD AUTO: 1.66 K/UL (ref 1–4.8)
LYMPHOCYTES # BLD AUTO: 2.87 K/UL (ref 1–4.8)
MCH RBC QN AUTO: 28.4 PG (ref 27–31)
MCH RBC QN AUTO: 28.6 PG (ref 27–31)
MCHC RBC AUTO-ENTMCNC: 31.4 G/DL (ref 32–36)
MCHC RBC AUTO-ENTMCNC: 31.6 G/DL (ref 32–36)
MCV RBC AUTO: 90 FL (ref 82–98)
MCV RBC AUTO: 91 FL (ref 82–98)
NUCLEATED RBC (/100WBC) (OHS): 0 /100 WBC
NUCLEATED RBC (/100WBC) (OHS): 0 /100 WBC
PLATELET # BLD AUTO: 183 K/UL (ref 150–450)
PLATELET # BLD AUTO: 237 K/UL (ref 150–450)
PMV BLD AUTO: 8.5 FL (ref 9.2–12.9)
PMV BLD AUTO: 9.4 FL (ref 9.2–12.9)
POCT GLUCOSE: 98 MG/DL (ref 70–110)
POTASSIUM SERPL-SCNC: 3.7 MMOL/L (ref 3.5–5.1)
POTASSIUM SERPL-SCNC: 3.8 MMOL/L (ref 3.5–5.1)
PROT SERPL-MCNC: 6.2 GM/DL (ref 6–8.4)
PROT SERPL-MCNC: 6.5 GM/DL (ref 6–8.4)
RBC # BLD AUTO: 5.04 M/UL (ref 4.6–6.2)
RBC # BLD AUTO: 5.17 M/UL (ref 4.6–6.2)
RELATIVE EOSINOPHIL (OHS): 0.8 %
RELATIVE EOSINOPHIL (OHS): 2.6 %
RELATIVE LYMPHOCYTE (OHS): 23.2 % (ref 18–48)
RELATIVE LYMPHOCYTE (OHS): 9.9 % (ref 18–48)
RELATIVE MONOCYTE (OHS): 6.3 % (ref 4–15)
RELATIVE MONOCYTE (OHS): 8.4 % (ref 4–15)
RELATIVE NEUTROPHIL (OHS): 65.1 % (ref 38–73)
RELATIVE NEUTROPHIL (OHS): 82.3 % (ref 38–73)
SODIUM SERPL-SCNC: 139 MMOL/L (ref 136–145)
SODIUM SERPL-SCNC: 140 MMOL/L (ref 136–145)
WBC # BLD AUTO: 12.36 K/UL (ref 3.9–12.7)
WBC # BLD AUTO: 16.7 K/UL (ref 3.9–12.7)

## 2025-04-17 PROCEDURE — 85025 COMPLETE CBC W/AUTO DIFF WBC: CPT | Performed by: EMERGENCY MEDICINE

## 2025-04-17 PROCEDURE — 25000003 PHARM REV CODE 250: Performed by: STUDENT IN AN ORGANIZED HEALTH CARE EDUCATION/TRAINING PROGRAM

## 2025-04-17 PROCEDURE — 99284 EMERGENCY DEPT VISIT MOD MDM: CPT | Mod: 25

## 2025-04-17 PROCEDURE — 96360 HYDRATION IV INFUSION INIT: CPT

## 2025-04-17 PROCEDURE — 85025 COMPLETE CBC W/AUTO DIFF WBC: CPT | Performed by: STUDENT IN AN ORGANIZED HEALTH CARE EDUCATION/TRAINING PROGRAM

## 2025-04-17 PROCEDURE — 84132 ASSAY OF SERUM POTASSIUM: CPT | Performed by: STUDENT IN AN ORGANIZED HEALTH CARE EDUCATION/TRAINING PROGRAM

## 2025-04-17 PROCEDURE — 82962 GLUCOSE BLOOD TEST: CPT

## 2025-04-17 PROCEDURE — 63600175 PHARM REV CODE 636 W HCPCS: Performed by: EMERGENCY MEDICINE

## 2025-04-17 PROCEDURE — 84295 ASSAY OF SERUM SODIUM: CPT | Performed by: EMERGENCY MEDICINE

## 2025-04-17 PROCEDURE — 36415 COLL VENOUS BLD VENIPUNCTURE: CPT | Performed by: EMERGENCY MEDICINE

## 2025-04-17 PROCEDURE — G0378 HOSPITAL OBSERVATION PER HR: HCPCS

## 2025-04-17 PROCEDURE — 36415 COLL VENOUS BLD VENIPUNCTURE: CPT | Performed by: STUDENT IN AN ORGANIZED HEALTH CARE EDUCATION/TRAINING PROGRAM

## 2025-04-17 RX ORDER — HYDROCODONE BITARTRATE AND ACETAMINOPHEN 7.5; 325 MG/1; MG/1
1 TABLET ORAL EVERY 6 HOURS PRN
Qty: 28 TABLET | Refills: 0 | Status: SHIPPED | OUTPATIENT
Start: 2025-04-17 | End: 2025-04-24

## 2025-04-17 RX ORDER — ONDANSETRON 4 MG/1
4 TABLET, FILM COATED ORAL EVERY 6 HOURS PRN
Qty: 40 TABLET | Refills: 0 | Status: SHIPPED | OUTPATIENT
Start: 2025-04-17 | End: 2025-04-27

## 2025-04-17 RX ORDER — METHOCARBAMOL 750 MG/1
750 TABLET, FILM COATED ORAL 4 TIMES DAILY
Qty: 40 TABLET | Refills: 0 | Status: SHIPPED | OUTPATIENT
Start: 2025-04-17 | End: 2025-04-27

## 2025-04-17 RX ADMIN — SODIUM CHLORIDE, POTASSIUM CHLORIDE, SODIUM LACTATE AND CALCIUM CHLORIDE 1000 ML: 600; 310; 30; 20 INJECTION, SOLUTION INTRAVENOUS at 02:04

## 2025-04-17 RX ADMIN — METHOCARBAMOL 750 MG: 750 TABLET ORAL at 10:04

## 2025-04-17 RX ADMIN — HYDROCODONE BITARTRATE AND ACETAMINOPHEN 1 TABLET: 10; 325 TABLET ORAL at 12:04

## 2025-04-17 RX ADMIN — HYDROCODONE BITARTRATE AND ACETAMINOPHEN 1 TABLET: 10; 325 TABLET ORAL at 06:04

## 2025-04-17 NOTE — ED PROVIDER NOTES
"Encounter Date: 4/17/2025       History     Chief Complaint   Patient presents with    Fatigue     Patient states, "I just had gallbladder surgery and may have left a little early. I fell and busted my lip. I got discharged this morning. I haven't eaten."     55 yo male here with complaints of feeling weak after d/c this AM s/p lap eliseo yesterday. No fever. No vomiting. Passing flatus. No abd pain. Reports feels lightheaded mostly with standing. Single episode of near syncope with fall upon standing hitting lip. No LOC.       Review of patient's allergies indicates:  No Known Allergies  Past Medical History:   Diagnosis Date    History of psychiatric hospitalization     Insomnia     Insomnia     Psychiatric problem      Past Surgical History:   Procedure Laterality Date    LAPAROSCOPIC CHOLECYSTECTOMY N/A 4/16/2025    Procedure: CHOLECYSTECTOMY, LAPAROSCOPIC;  Surgeon: Morena Bang MD;  Location: [facility];  Service: General;  Laterality: N/A;     No family history on file.  Social History[1]  Review of Systems   Constitutional:  Positive for fatigue.   Respiratory: Negative.     Cardiovascular: Negative.    Gastrointestinal: Negative.    All other systems reviewed and are negative.      Physical Exam     Initial Vitals [04/17/25 1307]   BP Pulse Resp Temp SpO2   101/68 65 18 97.5 °F (36.4 °C) 96 %      MAP       --         Physical Exam    Nursing note and vitals reviewed.  Constitutional: He appears well-developed and well-nourished. He is not diaphoretic. No distress.   HENT:   Head: Normocephalic and atraumatic.   Eyes: EOM are normal. Pupils are equal, round, and reactive to light.   Neck: Neck supple.   Normal range of motion.  Cardiovascular:  Normal rate, regular rhythm and intact distal pulses.           Pulmonary/Chest: Breath sounds normal. No respiratory distress. He has no wheezes. He has no rales.   Abdominal: Abdomen is soft. Bowel sounds are normal. He exhibits no distension. There is no " abdominal tenderness. There is no rebound.   Musculoskeletal:         General: No tenderness or edema. Normal range of motion.      Cervical back: Normal range of motion and neck supple.     Neurological: He is alert and oriented to person, place, and time.   Skin: Skin is warm and dry. Capillary refill takes less than 2 seconds.   Psychiatric: He has a normal mood and affect. Thought content normal.         ED Course   Procedures  Labs Reviewed   CBC WITH DIFFERENTIAL - Abnormal       Result Value    WBC 16.70 (*)     RBC 5.17      HGB 14.8      HCT 47.2      MCV 91      MCH 28.6      MCHC 31.4 (*)     RDW 12.9      Platelet Count 183      MPV 9.4      Nucleated RBC 0      Neut % 82.3 (*)     Lymph % 9.9 (*)     Mono % 6.3      Eos % 0.8      Basophil % 0.2      Imm Grans % 0.5      Neut # 13.73 (*)     Lymph # 1.66      Mono # 1.05 (*)     Eos # 0.14      Baso # 0.04      Imm Grans # 0.08 (*)    COMPREHENSIVE METABOLIC PANEL - Normal    Sodium 140      Potassium 3.8      Chloride 107      CO2 25      Glucose 109      BUN 7      Creatinine 1.1      Calcium 8.8      Protein Total 6.5      Albumin 3.9      Bilirubin Total 0.5      ALP 73      AST 24      ALT 24      Anion Gap 8      eGFR >60     CBC W/ AUTO DIFFERENTIAL    Narrative:     The following orders were created for panel order CBC auto differential.  Procedure                               Abnormality         Status                     ---------                               -----------         ------                     CBC with Differential[[id number]]       Abnormal            Final result                 Please view results for these tests on the individual orders.          Imaging Results    None          Medications   lactated ringers bolus 1,000 mL (1,000 mLs Intravenous New Bag 4/17/25 1451)     Medical Decision Making  Belly soft and without significant TTP. Mild leukocytosis consistent with post op state. Feels better with IVF.     Problems  Addressed:  Dehydration: acute illness or injury  Fatigue, unspecified type: acute illness or injury    Amount and/or Complexity of Data Reviewed  Labs: ordered. Decision-making details documented in ED Course.                                      Clinical Impression:  Final diagnoses:  [R53.83] Fatigue, unspecified type (Primary)  [E86.0] Dehydration          ED Disposition Condition    Discharge Stable          ED Prescriptions    None       Follow-up Information       Follow up With Specialties Details Why Contact Info    Yamilet Hale FNP Family Medicine Schedule an appointment as soon as possible for a visit   [address] [place] [address]  [phone]                 [1]   Social History  Tobacco Use    Smoking status: Every Day     Current packs/day: 0.50     Average packs/day: 0.5 packs/day for 25.0 years (12.5 ttl pk-yrs)     Types: Cigarettes    Smokeless tobacco: Never   Substance Use Topics    Alcohol use: Not Currently     Comment: Pt reports he is not drinking currently but does drink once every two weeks    Drug use: Not Currently     Comment: Pt reports he is not currently using drugs, but he does have a history of cocaine and meth use        Kirby Conteh MD  04/17/25 9813

## 2025-04-17 NOTE — PLAN OF CARE
Spoke to Dr. Bang's office and appointment for 2 week follow up is scheduled.  Patient has office number to call if any needs prior to then.

## 2025-04-17 NOTE — ED NOTES
Pt requesting to have something to eat, but advised pt and family to wait until Doctor has seen the pt

## 2025-04-17 NOTE — PLAN OF CARE
[facility] Surg  Discharge Final Note    Primary Care Provider: Yamilet Hale FNP    Expected Discharge Date: 4/17/2025    Final Discharge Note (most recent)       Final Note - 04/17/25 1033          Final Note    Assessment Type Final Discharge Note     Anticipated Discharge Disposition Home or Self Care     Hospital Resources/Appts/Education Provided Appointments scheduled and added to AVS        Post-Acute Status    Discharge Delays None known at this time                     Important Message from Medicare             Contact Info       Morena Bang MD   Specialty: General Surgery    [address] [address]   Phone: [phone]       Next Steps: Go on 5/6/2025    Instructions: 2:00  Call if any needs before then.        Final note is completed. The patient will be discharging home with self-care.

## 2025-04-17 NOTE — PLAN OF CARE
AVS reviewed w/ pt and IV discontinued per discharge orders. Pt refused virtual nurse teaching. Pt discharged home via self.

## 2025-04-17 NOTE — ANESTHESIA POSTPROCEDURE EVALUATION
Anesthesia Post Evaluation    Patient: Leyla Rayo    Procedure(s) Performed: Procedure(s) (LRB):  CHOLECYSTECTOMY, LAPAROSCOPIC (N/A)    Final Anesthesia Type: general      Patient location during evaluation: med/surg floor  Patient participation: Yes- Able to Participate  Level of consciousness: awake  Post-procedure vital signs: reviewed and stable  Pain management: adequate  Airway patency: patent    PONV status at discharge: No PONV  Anesthetic complications: no      Cardiovascular status: blood pressure returned to baseline  Respiratory status: spontaneous ventilation  Hydration status: euvolemic  Follow-up not needed.              Vitals Value Taken Time   /70 04/17/25 07:32   Temp 36.8 °C (98.2 °F) 04/17/25 07:32   Pulse 71 04/17/25 07:32   Resp 18 04/17/25 07:32   SpO2 95 % 04/17/25 07:32         Event Time   Out of Recovery 04/16/2025 17:18:45         Pain/Shyla Score: Pain Rating Prior to Med Admin: 8 (4/17/2025  6:48 AM)  Pain Rating Post Med Admin: 2 (4/17/2025  7:48 AM)  Shyla Score: 9 (4/16/2025  5:18 PM)

## 2025-04-17 NOTE — DISCHARGE INSTRUCTIONS
Our goal at [facility] is to always give you outstanding care and exceptional service. You may receive a survey from [employer] by mail, text or e-mail in the next 24-48 hours asking about the care you received with us. The survey should only take 5-10 minutes to complete and is very important to us.     Your feedback provides us with a way to recognize our staff who work tirelessly to provide the best care! Also, your responses help us learn how to improve when your experience was below our aspiration of excellence. We are always looking for ways to improve your stay. We WILL use your feedback to continue making improvements to help us provide the highest quality care. We keep your personal information and feedback confidential. We appreciate your time completing this survey and can't wait to hear from you!!!    We look forward to your continued care with us! Thanks so much for choosing [facility] for your healthcare needs!

## 2025-04-21 NOTE — DISCHARGE SUMMARY
[facility]  General Surgery  Discharge Summary      Patient Name: Leyla Rayo  MRN: [id number]  Admission Date: 4/15/2025  Hospital Length of Stay: 1 days  Discharge Date and Time: 4/17/2025 10:05 AM  Attending Physician: No att. providers found   Discharging Provider: Morena Bang MD  Primary Care Provider: Yamilet Hale FNP    HPI:   GS HPI: 53yo M with a PMHx of psychiatric disorder admitted to general surgery for cholelithiasis with acute cholecystitis. Patient reports abdominal pain onset yesterday after eating a hot dog described as burning, progressively worsened. Imaging confirmed cholecystitis. Leukocytosis noted, afebrile.     Procedure(s) (LRB):  CHOLECYSTECTOMY, LAPAROSCOPIC (N/A)      Indwelling Lines/Drains at time of discharge:   Lines/Drains/Airways       None                 Hospital Course: Patient admitted on 04/15 with a acute cholecystitis.  Admitted to the floor with IV antibiotics and underwent uncomplicated lap cholecystectomy on 04/16.  On postop day 1 patient tolerating liquid diet with pain controlled with oral medication and deemed eligible for discharge.    Goals of Care Treatment Preferences:  Code Status: Full Code      Consults:     Significant Diagnostic Studies: see above    Pending Diagnostic Studies:       Procedure Component Value Units Date/Time    Specimen to Pathology General Surgery [[id number]] Collected: 04/16/25 1620    Order Status: Sent Lab Status: In process Updated: 04/16/25 1733    Specimen: Tissue from Gallbladder           Final Active Diagnoses:    Diagnosis Date Noted POA    PRINCIPAL PROBLEM:  Calculus of gallbladder with acute cholecystitis without obstruction [K80.00] 04/15/2025 Yes    Tobacco dependency [F17.200] 04/15/2025 Yes      Problems Resolved During this Admission:      Discharged Condition: good    Disposition: Home or Self Care    Follow Up:   Follow-up Information       Morena Bang MD. Go on 5/6/2025.    Specialty: General  Surgery  Why: 2:00  Call if any needs before then.  Contact information:  [address] [facility]  [address] [place] [address]  [phone]                           Patient Instructions:      Diet Adult Regular   Order Comments: Low fat     Lifting restrictions   Order Comments: No lifting, pushing, or pulling greater than 10lbs for 6 weeks to reduce risk of hernia   You may return to work in one week if you are able to adhere to the lifting restriction above  If you are unable to perform your regular duties with the restrictions, please contact Dr. Bang's office     No driving until:   Order Comments: No driving while on narcotic pain medication  No driving for two weeks until follow up with Dr. Bang     No dressing needed   Order Comments: Keep dry for 24 hours   After 24 hours, you may shower daily and allow soapy water to run over incisions  Skin glue will fall off on its own with time  Do not scrub or submerge in water for two weeks     Notify your health care provider if you experience any of the following:  temperature >100.4     Notify your health care provider if you experience any of the following:  persistent nausea and vomiting or diarrhea     Notify your health care provider if you experience any of the following:  severe uncontrolled pain     Notify your health care provider if you experience any of the following:  redness, tenderness, or signs of infection (pain, swelling, redness, odor or green/yellow discharge around incision site)     Activity as tolerated     Medications:  Reconciled Home Medications:      Medication List        START taking these medications      HYDROcodone-acetaminophen 7.5-325 mg per tablet  Commonly known as: NORCO  Take 1 tablet by mouth every 6 (six) hours as needed for Pain.     methocarbamoL 750 MG Tab  Commonly known as: ROBAXIN  Take 1 tablet (750 mg total) by mouth 4 (four) times daily. for 10 days     ondansetron 4 MG tablet  Commonly known as: ZOFRAN  Take  1 tablet (4 mg total) by mouth every 6 (six) hours as needed for Nausea.            STOP taking these medications      gabapentin 300 MG capsule  Commonly known as: NEURONTIN     hydrOXYzine 100 MG capsule  Commonly known as: VISTARIL     mirtazapine 30 MG tablet  Commonly known as: REMERON     naproxen 500 MG tablet  Commonly known as: NAPROSYN     venlafaxine 75 MG 24 hr capsule  Commonly known as: EFFEXOR-XR            Time spent on the discharge of patient: 8 minutes    Morena Bang MD  General Surgery  [facility]

## 2025-04-21 NOTE — HOSPITAL COURSE
Patient admitted on 04/15 with a acute cholecystitis.  Admitted to the floor with IV antibiotics and underwent uncomplicated lap cholecystectomy on 04/16.  On postop day 1 patient tolerating liquid diet with pain controlled with oral medication and deemed eligible for discharge.

## 2025-04-23 LAB — VIEW PATHOLOGY REPORT (RELIAPATH): NORMAL

## (undated) DEVICE — APPLICATOR CHLORAPREP ORN 26ML

## (undated) DEVICE — LINER SUCTION CANNISTER REGUGA

## (undated) DEVICE — UNDERGLOVES BIOGEL PI SIZE 7.5

## (undated) DEVICE — TROCAR ENDOPATH XCEL 11MM 10CM

## (undated) DEVICE — SET PNEUMOCLEAR HEAT HUM SE HF

## (undated) DEVICE — SLEEVE SCD EXPRESS KNEE LARGE

## (undated) DEVICE — SYS SEE SHARP SCP ANTIFG LNG

## (undated) DEVICE — APPLIER CLIP ENDO MED/LG 10MM

## (undated) DEVICE — BAG SPEC RETRV ENDO 4X6IN DISP

## (undated) DEVICE — LINER GLOVE POWDERFREE SZ 7

## (undated) DEVICE — UNDERPAD DELUXE FLUFF 30X30IN

## (undated) DEVICE — ENDO GRASPER ETHICON 5DSG

## (undated) DEVICE — ELECTRODE REM PLYHSV RETURN 9

## (undated) DEVICE — BLANKET WARMING UPPER BODY

## (undated) DEVICE — SCISSOR 5MMX35CM DIRECT DRIVE

## (undated) DEVICE — TROCAR ENDOPATH XCEL 5X100MM

## (undated) DEVICE — Device

## (undated) DEVICE — GLOVE SURG ULTRA TOUCH 7

## (undated) DEVICE — SOL NACL IRR 1000ML BTL

## (undated) DEVICE — TUBE SUMP NASOGASTRIC 16FR

## (undated) DEVICE — DISSECTOR 5MM ENDOPATH

## (undated) DEVICE — BLADE SURG CARBON STEEL SZ11

## (undated) DEVICE — COVER OVERHEAD SURG LT BLUE

## (undated) DEVICE — SYR 10CC LUER LOCK

## (undated) DEVICE — GLOVE SURGICAL LATEX SZ 6.5

## (undated) DEVICE — SOL IRRI STRL WATER 1000ML

## (undated) DEVICE — LAP L HOOK

## (undated) DEVICE — SUT VICRYL+ 27 UR-6 VIOL

## (undated) DEVICE — LINER GLOVE POWDERFREE SZ 6.5

## (undated) DEVICE — SUT MONOCYRL 4-0 PS2 UND

## (undated) DEVICE — STRAP KNEE & BODY DISP 4X34IN

## (undated) DEVICE — TROCAR ENDOPATH XCEL 12X100MM

## (undated) DEVICE — GOWN NONREINF SET-IN SLV XL

## (undated) DEVICE — PENCIL SMK EVAC CONNECTOR 10FT